# Patient Record
Sex: MALE | Race: BLACK OR AFRICAN AMERICAN | NOT HISPANIC OR LATINO | Employment: FULL TIME | URBAN - METROPOLITAN AREA
[De-identification: names, ages, dates, MRNs, and addresses within clinical notes are randomized per-mention and may not be internally consistent; named-entity substitution may affect disease eponyms.]

---

## 2019-09-18 LAB
ALBUMIN UR-MCNC: NEGATIVE MG/DL
APPEARANCE UR: CLEAR
BACTERIA #/AREA URNS HPF: ABNORMAL /HPF
BILIRUB UR QL STRIP: NEGATIVE
COLOR UR AUTO: ABNORMAL
GLUCOSE UR STRIP-MCNC: NEGATIVE MG/DL
HGB UR QL STRIP: ABNORMAL
KETONES UR STRIP-MCNC: NEGATIVE MG/DL
LEUKOCYTE ESTERASE UR QL STRIP: NEGATIVE
NITRATE UR QL: NEGATIVE
PH UR STRIP: 5.5 PH (ref 5–7)
RBC #/AREA URNS AUTO: 15 /HPF (ref 0–2)
SOURCE: ABNORMAL
SP GR UR STRIP: 1 (ref 1–1.03)
UROBILINOGEN UR STRIP-MCNC: NORMAL MG/DL (ref 0–2)
WBC #/AREA URNS AUTO: 1 /HPF (ref 0–5)

## 2019-09-18 PROCEDURE — 96360 HYDRATION IV INFUSION INIT: CPT

## 2019-09-18 PROCEDURE — 99283 EMERGENCY DEPT VISIT LOW MDM: CPT

## 2019-09-18 PROCEDURE — 81001 URINALYSIS AUTO W/SCOPE: CPT | Performed by: EMERGENCY MEDICINE

## 2019-09-18 ASSESSMENT — MIFFLIN-ST. JEOR: SCORE: 1623.07

## 2019-09-19 ENCOUNTER — HOSPITAL ENCOUNTER (EMERGENCY)
Facility: CLINIC | Age: 29
Discharge: HOME | End: 2019-09-19
Attending: EMERGENCY MEDICINE | Admitting: EMERGENCY MEDICINE
Payer: OTHER GOVERNMENT

## 2019-09-19 VITALS
SYSTOLIC BLOOD PRESSURE: 136 MMHG | TEMPERATURE: 97.2 F | HEART RATE: 76 BPM | BODY MASS INDEX: 27.55 KG/M2 | DIASTOLIC BLOOD PRESSURE: 93 MMHG | OXYGEN SATURATION: 100 % | RESPIRATION RATE: 18 BRPM | HEIGHT: 69 IN | WEIGHT: 186 LBS

## 2019-09-19 DIAGNOSIS — R31.0 GROSS HEMATURIA: ICD-10-CM

## 2019-09-19 LAB
ANION GAP SERPL CALCULATED.3IONS-SCNC: 1 MMOL/L (ref 3–14)
BUN SERPL-MCNC: 10 MG/DL (ref 7–30)
CALCIUM SERPL-MCNC: 8.7 MG/DL (ref 8.5–10.1)
CHLORIDE SERPL-SCNC: 101 MMOL/L (ref 94–109)
CK SERPL-CCNC: 295 U/L (ref 30–300)
CO2 SERPL-SCNC: 31 MMOL/L (ref 20–32)
CREAT SERPL-MCNC: 1.11 MG/DL (ref 0.66–1.25)
GFR SERPL CREATININE-BSD FRML MDRD: 66 ML/MIN/{1.73_M2}
GLUCOSE SERPL-MCNC: 96 MG/DL (ref 70–99)
POTASSIUM SERPL-SCNC: 4 MMOL/L (ref 3.4–5.3)
SODIUM SERPL-SCNC: 133 MMOL/L (ref 133–144)

## 2019-09-19 PROCEDURE — 25000128 H RX IP 250 OP 636: Performed by: EMERGENCY MEDICINE

## 2019-09-19 PROCEDURE — 82550 ASSAY OF CK (CPK): CPT | Performed by: EMERGENCY MEDICINE

## 2019-09-19 PROCEDURE — 80048 BASIC METABOLIC PNL TOTAL CA: CPT | Performed by: EMERGENCY MEDICINE

## 2019-09-19 RX ORDER — SODIUM CHLORIDE 9 MG/ML
1000 INJECTION, SOLUTION INTRAVENOUS CONTINUOUS
Status: DISCONTINUED | OUTPATIENT
Start: 2019-09-19 | End: 2019-09-19 | Stop reason: HOSPADM

## 2019-09-19 RX ADMIN — SODIUM CHLORIDE 1000 ML: 9 INJECTION, SOLUTION INTRAVENOUS at 00:43

## 2019-09-19 ASSESSMENT — ENCOUNTER SYMPTOMS
ABDOMINAL PAIN: 0
HEMATURIA: 1
FLANK PAIN: 0

## 2019-09-19 NOTE — ED AVS SNAPSHOT
[facility] Emergency Department  [address]  [facility] [address]  Phone:  [phone]  Fax:  [phone]                                    Luis Alberto Alanis   MRN: [id number]    Department:  [facility] Emergency Department   Date of Visit:  9/18/2019           After Visit Summary Signature Page    I have received my discharge instructions, and my questions have been answered. I have discussed any challenges I see with this plan with the nurse or doctor.    ..........................................................................................................................................  Patient/Patient Representative Signature      ..........................................................................................................................................  Patient Representative Print Name and Relationship to Patient    ..................................................               ................................................  Date                                   Time    ..........................................................................................................................................  Reviewed by Signature/Title    ...................................................              ..............................................  Date                                               Time          22EPIC Rev 08/18

## 2019-09-19 NOTE — DISCHARGE INSTRUCTIONS
Your symptoms are suggestive of strain related hematuria, which is benign. Please follow up with urology for further evaluation as needed. Return with fever, flank pain, or inability to empty bladder.

## 2019-09-19 NOTE — ED PROVIDER NOTES
"  History     Chief Complaint:  Hematuria    The history is provided by the patient.      Luis Alberto Alanis is a 29 year old female who presents with concerns for hematuria two hours prior. He reports seeing bright red blood in the toilet after voiding tonight and details he ran twice tonight. He affirms history of similar symptoms (dark-red urine) secondary to RhabDo that usually resolves after fluid and states this also happened over the weekend but has since resolved. Luis Alberto denies any abdominal pain, flank pain, or current thinners.      Allergies:  No Known Drug Allergies    Medications:    Medications reviewed. No current medications.     Past Medical History:    Medical history reviewed. No pertinent medical history.    Past Surgical History:    Surgical history reviewed. No pertinent surgical history.    Family History:    Family history reviewed. No pertinent family history.     Social History:  Presents alone    Review of Systems   Gastrointestinal: Negative for abdominal pain.   Genitourinary: Positive for hematuria. Negative for flank pain.   All other systems reviewed and are negative.    Physical Exam   Patient Vitals for the past 24 hrs:   BP Temp Temp src Pulse Heart Rate Resp SpO2 Height Weight   09/19/19 0115 -- -- -- -- -- -- 100 % -- --   09/19/19 0100 -- -- -- -- -- -- 100 % -- --   09/19/19 0045 -- -- -- -- -- -- 100 % -- --   09/18/19 2259 (!) 136/93 97.2  F (36.2  C) Oral 76 76 18 96 % 1.753 m (5' 9\") 84.4 kg (186 lb)     Physical Exam   Constitutional: He is oriented to person, place, and time. He appears well-developed.   HENT:   Head: Normocephalic.   Cardiovascular: Normal rate.   Pulmonary/Chest: Effort normal.   Abdominal: Soft. He exhibits no distension. There is no tenderness.   Musculoskeletal: Normal range of motion.   Neurological: He is alert and oriented to person, place, and time.   Skin: Skin is warm. Capillary refill takes less than 2 seconds.       Emergency Department Course "     Laboratory:  UA: blood moderate (A) RBC 15 (H) bacteria few (A) o/w WNL    BMP: anion gap 1 (L) o/w WNL (creatinine 1.11)    CK total: 295    Emergency Department Course:  Nursing notes and vitals reviewed.  2305 The patient provided a urine sample here in the emergency department. This was sent for laboratory testing, findings above  0007 I performed an exam of the patient as documented above.   0042 Blood was drawn for laboratory testing, results above.  0115 I personally reviewed the labs with the patient and answered all related questions prior to discharge, anticipatory guidance given.    Impression & Plan      Medical Decision Making:  Luis Alberto Alanis is a 29 year old female who presents to the emergency department today for evaluation of painless gross hematuria.  Patient has a history of prior possible rhabdomyolysis.  However this time he is seeing red blood.  This is associated with exercise.  I suspect stress related.  The patient has no pain.  His examination is normal.  Patient seem to be profoundly concerned about rhabdo and therefore CK and BUN and creatinine were ordered and were negative.  She was offered reassurance.  Patient will need to follow-up with urology as I suspect at some point he may need a cystis scope to evaluate for gross hematuria.  Though in this 29-year-old otherwise healthy male if he continues to have blood with exercise by definition this is exertional hematuria and requires no further work-up at this time.  Patient is not on anticoagulation recommended to avoid aspirin and follow-up with urology.    Diagnosis:    ICD-10-CM   1. Gross hematuria R31.0     Disposition: Home    Scribe Disclosure:  Jose M THOMSON, am serving as a scribe at 12:11 AM on 9/19/2019 to document services personally performed by Mannie Edwards MD based on my observations and the provider's statements to me.    [facility] EMERGENCY DEPARTMENT       Mannie Edwards MD  09/20/19  1039

## 2019-09-19 NOTE — ED TRIAGE NOTES
Noticed blood in urine last night. Today urine was cleared but about 2 hours ago, noticed bright blood in urine again. ABCs intact. History of rhabdomyolysis with blood in urine. Denies any pain. ABCs intact.

## 2019-10-15 DIAGNOSIS — R31.9 HEMATURIA: Primary | ICD-10-CM

## 2020-04-04 ENCOUNTER — NURSE TRIAGE (OUTPATIENT)
Dept: NURSING | Facility: CLINIC | Age: 30
End: 2020-04-04

## 2020-04-05 NOTE — TELEPHONE ENCOUNTER
Symptoms started on thursday, lots of drainage in nose and down throat. Main complaint is nasal congestion/swelling. FNA advised continuing the oral antihistamines and adding decongestant nose drops.FNA discussed common COVID symptoms vs allergy symptoms. Advised to speak to his PCP next week if not improved.  Patient voiced understand and will follow disposition.   Meenu Liz RN  [facility] Nurse Advisor          Reason for Disposition    [1] Taking antihistamines > 2 days AND [2] nasal allergy symptoms interfere with sleep, school, or work    Additional Information    Negative: [1] Wheezing (high pitched whistling sound) AND [2] previous asthma attacks or use of asthma medicines    Negative: [1] Wheezing (high pitched whistling sound) AND [2] no history of asthma    Negative: Eye redness and itching are the only symptoms    Negative: Doesn't match the SYMPTOMS for Hay Fever    Negative: Patient sounds very sick or weak to the triager    Negative: Lots of coughing    Negative: [1] Lots of yellow or green discharge from nose AND [2] present > 3 days    Protocols used: NASAL ALLERGIES (HAY FEVER)-A-

## 2021-02-19 ENCOUNTER — TRANSFERRED RECORDS (OUTPATIENT)
Dept: HEALTH INFORMATION MANAGEMENT | Facility: CLINIC | Age: 31
End: 2021-02-19
Payer: OTHER GOVERNMENT

## 2021-02-19 LAB
ALT SERPL-CCNC: 14 U/L (ref 4–50)
AST SERPL-CCNC: 27 U/L (ref 12–35)
CHOLESTEROL (EXTERNAL): 219 MG/DL (ref 90–200)
CREATININE (EXTERNAL): 1.3 MG/DL (ref 0.6–1.3)
GLUCOSE (EXTERNAL): 90 MG/DL (ref 60–115)
HDLC SERPL-MCNC: 44 MG/DL
LDL CHOLESTEROL (EXTERNAL): 159 MG/DL
POTASSIUM (EXTERNAL): 3.9 MMOL/L (ref 3.5–4.9)
TRIGLYCERIDES (EXTERNAL): 82 MG/DL (ref 40–197)
TSH SERPL-ACNC: 1.5 UIU/ML (ref 0.27–4.2)

## 2021-12-06 ENCOUNTER — TELEPHONE (OUTPATIENT)
Dept: FAMILY MEDICINE | Facility: CLINIC | Age: 31
End: 2021-12-06
Payer: OTHER GOVERNMENT

## 2021-12-06 NOTE — TELEPHONE ENCOUNTER
Reason for Call:  Other appointment    Detailed comments: Mother called and is requesting to see if pt can worked in for stomach cramping for 3 weeks no abdominal pain no other symptoms please advise thank you    Phone Number Patient can be reached at: Other phone number:  [phone]    Best Time: anytime    Can we leave a detailed message on this number? YES    Call taken on 12/6/2021 at 11:59 AM by Alexandra Malone

## 2021-12-06 NOTE — TELEPHONE ENCOUNTER
Spoke with the mother Lois who states they are scheduled in [place] for tomorrow.  Micaela Field,

## 2021-12-07 ENCOUNTER — TRANSFERRED RECORDS (OUTPATIENT)
Dept: HEALTH INFORMATION MANAGEMENT | Facility: CLINIC | Age: 31
End: 2021-12-07

## 2021-12-07 ENCOUNTER — OFFICE VISIT (OUTPATIENT)
Dept: FAMILY MEDICINE | Facility: CLINIC | Age: 31
End: 2021-12-07
Payer: OTHER GOVERNMENT

## 2021-12-07 VITALS
OXYGEN SATURATION: 99 % | WEIGHT: 180.4 LBS | BODY MASS INDEX: 26.72 KG/M2 | HEART RATE: 73 BPM | HEIGHT: 69 IN | SYSTOLIC BLOOD PRESSURE: 122 MMHG | DIASTOLIC BLOOD PRESSURE: 76 MMHG | RESPIRATION RATE: 18 BRPM | TEMPERATURE: 97.8 F

## 2021-12-07 DIAGNOSIS — R10.9 ABDOMINAL CRAMPING: ICD-10-CM

## 2021-12-07 DIAGNOSIS — Z13.220 SCREENING FOR HYPERLIPIDEMIA: ICD-10-CM

## 2021-12-07 DIAGNOSIS — Z12.5 SCREENING FOR PROSTATE CANCER: Primary | ICD-10-CM

## 2021-12-07 DIAGNOSIS — R14.0 ABDOMINAL BLOATING: ICD-10-CM

## 2021-12-07 PROBLEM — R31.9 HEMATURIA: Status: ACTIVE | Noted: 2021-12-07

## 2021-12-07 PROBLEM — E80.4 GILBERT'S SYNDROME: Status: ACTIVE | Noted: 2021-12-07

## 2021-12-07 PROBLEM — N28.9 ACUTE RENAL INSUFFICIENCY: Status: RESOLVED | Noted: 2021-12-07 | Resolved: 2021-12-07

## 2021-12-07 PROBLEM — D72.819 LEUKOPENIA: Status: RESOLVED | Noted: 2021-12-07 | Resolved: 2021-12-07

## 2021-12-07 PROBLEM — N28.9 ACUTE RENAL INSUFFICIENCY: Status: ACTIVE | Noted: 2021-12-07

## 2021-12-07 PROBLEM — R31.9 HEMATURIA: Status: RESOLVED | Noted: 2021-12-07 | Resolved: 2021-12-07

## 2021-12-07 PROBLEM — M62.82 RHABDOMYOLYSIS: Status: RESOLVED | Noted: 2021-12-07 | Resolved: 2021-12-07

## 2021-12-07 PROBLEM — D72.819 LEUKOPENIA: Status: ACTIVE | Noted: 2021-12-07

## 2021-12-07 PROBLEM — M62.82 RHABDOMYOLYSIS: Status: ACTIVE | Noted: 2021-12-07

## 2021-12-07 PROCEDURE — 90471 IMMUNIZATION ADMIN: CPT | Performed by: PHYSICIAN ASSISTANT

## 2021-12-07 PROCEDURE — 36415 COLL VENOUS BLD VENIPUNCTURE: CPT | Performed by: PHYSICIAN ASSISTANT

## 2021-12-07 PROCEDURE — 90715 TDAP VACCINE 7 YRS/> IM: CPT | Performed by: PHYSICIAN ASSISTANT

## 2021-12-07 PROCEDURE — G0103 PSA SCREENING: HCPCS | Performed by: PHYSICIAN ASSISTANT

## 2021-12-07 PROCEDURE — 99204 OFFICE O/P NEW MOD 45 MIN: CPT | Mod: 25 | Performed by: PHYSICIAN ASSISTANT

## 2021-12-07 PROCEDURE — 80061 LIPID PANEL: CPT | Performed by: PHYSICIAN ASSISTANT

## 2021-12-07 RX ORDER — ESCITALOPRAM OXALATE 10 MG/1
10 TABLET ORAL PRN
COMMUNITY
Start: 2021-01-18 | End: 2022-04-12

## 2021-12-07 RX ORDER — OMEPRAZOLE 10 MG/1
20 CAPSULE, DELAYED RELEASE ORAL DAILY
COMMUNITY
End: 2022-04-12

## 2021-12-07 RX ORDER — ESZOPICLONE 3 MG/1
3 TABLET, FILM COATED ORAL AT BEDTIME
COMMUNITY
Start: 2021-07-20 | End: 2022-04-12

## 2021-12-07 RX ORDER — METRONIDAZOLE 250 MG/1
250 TABLET ORAL 3 TIMES DAILY
COMMUNITY
End: 2022-04-12

## 2021-12-07 RX ORDER — ERGOCALCIFEROL 1.25 MG/1
50000 CAPSULE, LIQUID FILLED ORAL WEEKLY
COMMUNITY
Start: 2021-10-25 | End: 2023-10-10

## 2021-12-07 ASSESSMENT — MIFFLIN-ST. JEOR: SCORE: 1753.67

## 2021-12-07 ASSESSMENT — PAIN SCALES - GENERAL: PAINLEVEL: NO PAIN (0)

## 2021-12-07 NOTE — PROGRESS NOTES
Assessment & Plan     Screening for prostate cancer  Patient requested screening, states this was screened by PCP in the past. Discussed, will proceed with screening per patient request.  - PSA, screen; Future  - PSA, screen    Screening for hyperlipidemia  - Lipid panel reflex to direct LDL Fasting; Future  - Lipid panel reflex to direct LDL Fasting    Abdominal bloating  Abdominal cramping  Following with [facility] as well. He actually just saw them this morning. Symptoms suspicious of IBS and labs are pending. SHARLA completed for [facility] to send results to me. Continue to follow with [facility].      Return in about 1 year (around 12/7/2022) for Preventive Physical Exam.    Kallie Young PA-C  [facility] [place]    45 minutes spent on the date of the encounter doing chart review, history and exam, documentation and further activities per the note     Subjective   Luis Alberto is a 31 year old who presents for the following health issues     History of Present Illness       He eats 2-3 servings of fruits and vegetables daily.He consumes 0 sweetened beverage(s) daily.He exercises with enough effort to increase his heart rate 30 to 60 minutes per day.  He exercises with enough effort to increase his heart rate 6 days per week.   He is taking medications regularly.       Concern - Abdominal Pain   Onset: Since November 1st    Description: Cramping, bloating, constipated    Intensity: severe  Progression of Symptoms:  worsening  Accompanying Signs & Symptoms: Gas, cramping   Previous history of similar problem: College, moving to [place], Moving to [place], 2016 - was diagnosed with IBS   Precipitating factors:        Worsened by: Stress related, he was chewing 1 pack of Extra sugar free gum a day - thinks that might be a factor.   Alleviating factors:        Improved by: Healthy diet   Therapies tried and outcome:  none     Upset stomach since November 1 - had COVID booster shot that day  Had diarrhea one day at the  "beginning of symptoms and low appetite, nausea but no vomiting. Took imodium for 3 days.  Those symptoms resolved but then started noticing stomach cramps, heartburn for about 1 week.  Took prilosec   Flagyl, bentyl from years ago when he was treated for IBS  Symptoms started to improve and he stopped the medications  Now noticing stomach cramping after bowel movements in the mornings  Will notice some \"stomach bubbling\" after eating  Increased gas  Normal bowel movements now - no constipation or diarrhea  Still noticing heartburn    On a diet for past few months, trying to lose weight  Mostly eating cucumbers, tomatoes, salads with chicken, pistachios, tuna    Has struggled with constipation in the past during college, had colonoscopy that he reports was normal. After graduating college, constipation resolved.    He did see [facility] this morning and was told symptoms were likely related to IBS. They ordered some labs and recommended dicyclomine and simethicone. They also ordered EGD per patient request.    He is establishing care here today. Would like cholesterol and PSA screened. He is highly invested in preventing chronic disease and wants to have everything checked.    Review of Systems   Constitutional, HEENT, cardiovascular, pulmonary, gi and gu systems are negative, except as otherwise noted.      Objective    /76 (BP Location: Right arm, Patient Position: Sitting, Cuff Size: Adult Regular)   Pulse 73   Temp 97.8  F (36.6  C) (Oral)   Resp 18   Ht 1.753 m (5' 9\")   Wt 81.8 kg (180 lb 6.4 oz)   SpO2 99%   BMI 26.64 kg/m    Body mass index is 26.64 kg/m .  Physical Exam   GENERAL: healthy, alert and no distress  NECK: no adenopathy, no asymmetry, masses, or scars and thyroid normal to palpation  RESP: lungs clear to auscultation - no rales, rhonchi or wheezes  CV: regular rate and rhythm  ABDOMEN: soft, nontender, no hepatosplenomegaly, no masses and bowel sounds normal  NEURO: Normal strength and " tone, mentation intact and speech normal  PSYCH: mentation appears normal, affect normal/bright

## 2021-12-08 LAB — PSA SERPL-MCNC: 0.61 UG/L (ref 0–4)

## 2021-12-14 ENCOUNTER — TELEPHONE (OUTPATIENT)
Dept: FAMILY MEDICINE | Facility: CLINIC | Age: 31
End: 2021-12-14
Payer: OTHER GOVERNMENT

## 2021-12-16 LAB
CHOLEST SERPL-MCNC: 213 MG/DL
FASTING STATUS PATIENT QL REPORTED: YES
HDLC SERPL-MCNC: 63 MG/DL
LDLC SERPL CALC-MCNC: 140 MG/DL
NONHDLC SERPL-MCNC: 150 MG/DL
TRIGL SERPL-MCNC: 48 MG/DL

## 2022-01-30 ENCOUNTER — HEALTH MAINTENANCE LETTER (OUTPATIENT)
Age: 32
End: 2022-01-30

## 2022-02-21 ENCOUNTER — TELEPHONE (OUTPATIENT)
Dept: FAMILY MEDICINE | Facility: CLINIC | Age: 32
End: 2022-02-21
Payer: OTHER GOVERNMENT

## 2022-02-21 NOTE — TELEPHONE ENCOUNTER
Reason for call:  Form   Our goal is to have forms completed within 72 hours, however some forms may require a visit or additional information.     Who is the form from? Patient  Where did the form come from? Patient or family brought in     What clinic location was the form placed at? [place]  Where was the form placed? Kallie Young Box/Folder  What number is listed as a contact on the form? [phone]    Phone call message - patient request for a letter, form or note:     Date needed: as soon as possible  Patient will  at the clinic when completed  Has the patient signed a consent form for release of information? NO    Additional comments:     Type of letter, form or note: medical    Phone number to reach patient:  Cell number on file:    Telephone Information:   Mobile [phone]       Best Time:  any    Can we leave a detailed message on this number?  YES    Travel screening: Not Applicable

## 2022-03-07 DIAGNOSIS — E55.9 VITAMIN D DEFICIENCY: Primary | ICD-10-CM

## 2022-03-07 RX ORDER — ERGOCALCIFEROL 1.25 MG/1
50000 CAPSULE, LIQUID FILLED ORAL WEEKLY
Status: CANCELLED | OUTPATIENT
Start: 2022-03-07

## 2022-03-07 NOTE — TELEPHONE ENCOUNTER
Called the pt to advise of below.      Offered to schedule appts for lab or for his ankle.  He will call back.  He says his schedule is very busy and he would just prefer prescriptions.  Advised again of below and explained rationale.  Had offered lab appt for today, advised they do book up, so call back when he is able.

## 2022-03-07 NOTE — TELEPHONE ENCOUNTER
I have not prescribed high dose vitamin D and we have not checked his labs. Recommend vitamin D lab and we can discuss if high dose vitamin D is needed after results.    He can use ibuprofen 600 mg 3 times daily for ankle pain if needed. Recommend visit if ongoing pain.    Kallie Young PA-C

## 2022-03-13 ENCOUNTER — APPOINTMENT (OUTPATIENT)
Dept: GENERAL RADIOLOGY | Facility: CLINIC | Age: 32
End: 2022-03-13
Attending: EMERGENCY MEDICINE
Payer: OTHER GOVERNMENT

## 2022-03-13 ENCOUNTER — HOSPITAL ENCOUNTER (EMERGENCY)
Facility: CLINIC | Age: 32
Discharge: HOME | End: 2022-03-14
Attending: EMERGENCY MEDICINE | Admitting: EMERGENCY MEDICINE
Payer: OTHER GOVERNMENT

## 2022-03-13 DIAGNOSIS — S90.32XA CONTUSION OF LEFT FOOT, INITIAL ENCOUNTER: ICD-10-CM

## 2022-03-13 DIAGNOSIS — R19.7 DIARRHEA, UNSPECIFIED TYPE: ICD-10-CM

## 2022-03-13 LAB
ALBUMIN SERPL-MCNC: 4 G/DL (ref 3.4–5)
ALP SERPL-CCNC: 59 U/L (ref 40–150)
ALT SERPL W P-5'-P-CCNC: 55 U/L (ref 0–70)
ANION GAP SERPL CALCULATED.3IONS-SCNC: 4 MMOL/L (ref 3–14)
AST SERPL W P-5'-P-CCNC: 41 U/L (ref 0–45)
BASOPHILS # BLD AUTO: 0 10E3/UL (ref 0–0.2)
BASOPHILS NFR BLD AUTO: 0 %
BILIRUB SERPL-MCNC: 2 MG/DL (ref 0.2–1.3)
BUN SERPL-MCNC: 13 MG/DL (ref 7–30)
CALCIUM SERPL-MCNC: 8.9 MG/DL (ref 8.5–10.1)
CHLORIDE BLD-SCNC: 110 MMOL/L (ref 94–109)
CO2 SERPL-SCNC: 25 MMOL/L (ref 20–32)
CREAT SERPL-MCNC: 1.27 MG/DL (ref 0.66–1.25)
EOSINOPHIL # BLD AUTO: 0.1 10E3/UL (ref 0–0.7)
EOSINOPHIL NFR BLD AUTO: 2 %
ERYTHROCYTE [DISTWIDTH] IN BLOOD BY AUTOMATED COUNT: 11.5 % (ref 10–15)
GFR SERPL CREATININE-BSD FRML MDRD: 76 ML/MIN/1.73M2
GLUCOSE BLD-MCNC: 88 MG/DL (ref 70–99)
HCT VFR BLD AUTO: 48 % (ref 40–53)
HGB BLD-MCNC: 15.6 G/DL (ref 13.3–17.7)
IMM GRANULOCYTES # BLD: 0 10E3/UL
IMM GRANULOCYTES NFR BLD: 0 %
LYMPHOCYTES # BLD AUTO: 1.3 10E3/UL (ref 0.8–5.3)
LYMPHOCYTES NFR BLD AUTO: 26 %
MCH RBC QN AUTO: 32.6 PG (ref 26.5–33)
MCHC RBC AUTO-ENTMCNC: 32.5 G/DL (ref 31.5–36.5)
MCV RBC AUTO: 100 FL (ref 78–100)
MONOCYTES # BLD AUTO: 0.9 10E3/UL (ref 0–1.3)
MONOCYTES NFR BLD AUTO: 18 %
NEUTROPHILS # BLD AUTO: 2.6 10E3/UL (ref 1.6–8.3)
NEUTROPHILS NFR BLD AUTO: 54 %
NRBC # BLD AUTO: 0 10E3/UL
NRBC BLD AUTO-RTO: 0 /100
PLATELET # BLD AUTO: 236 10E3/UL (ref 150–450)
POTASSIUM BLD-SCNC: 3.8 MMOL/L (ref 3.4–5.3)
PROT SERPL-MCNC: 7.8 G/DL (ref 6.8–8.8)
RBC # BLD AUTO: 4.78 10E6/UL (ref 4.4–5.9)
SODIUM SERPL-SCNC: 139 MMOL/L (ref 133–144)
WBC # BLD AUTO: 4.9 10E3/UL (ref 4–11)

## 2022-03-13 PROCEDURE — 99284 EMERGENCY DEPT VISIT MOD MDM: CPT | Mod: 25

## 2022-03-13 PROCEDURE — 85025 COMPLETE CBC W/AUTO DIFF WBC: CPT | Performed by: EMERGENCY MEDICINE

## 2022-03-13 PROCEDURE — 73630 X-RAY EXAM OF FOOT: CPT | Mod: LT

## 2022-03-13 PROCEDURE — 87493 C DIFF AMPLIFIED PROBE: CPT | Performed by: EMERGENCY MEDICINE

## 2022-03-13 PROCEDURE — 96361 HYDRATE IV INFUSION ADD-ON: CPT

## 2022-03-13 PROCEDURE — 87506 IADNA-DNA/RNA PROBE TQ 6-11: CPT | Performed by: EMERGENCY MEDICINE

## 2022-03-13 PROCEDURE — 258N000003 HC RX IP 258 OP 636: Performed by: EMERGENCY MEDICINE

## 2022-03-13 PROCEDURE — 96360 HYDRATION IV INFUSION INIT: CPT

## 2022-03-13 PROCEDURE — 36415 COLL VENOUS BLD VENIPUNCTURE: CPT | Performed by: EMERGENCY MEDICINE

## 2022-03-13 PROCEDURE — 80053 COMPREHEN METABOLIC PANEL: CPT | Performed by: EMERGENCY MEDICINE

## 2022-03-13 RX ORDER — DICYCLOMINE HCL 20 MG
20 TABLET ORAL 4 TIMES DAILY PRN
Qty: 20 TABLET | Refills: 0 | Status: SHIPPED | OUTPATIENT
Start: 2022-03-13 | End: 2022-03-23

## 2022-03-13 RX ORDER — SODIUM CHLORIDE 9 MG/ML
INJECTION, SOLUTION INTRAVENOUS CONTINUOUS
Status: DISCONTINUED | OUTPATIENT
Start: 2022-03-13 | End: 2022-03-14 | Stop reason: HOSPADM

## 2022-03-13 RX ADMIN — SODIUM CHLORIDE 1000 ML: 9 INJECTION, SOLUTION INTRAVENOUS at 22:38

## 2022-03-13 NOTE — LETTER
March 13, 2022      To Whom It May Concern:      Luis Alberto Alanis was seen in our Emergency Department today, 03/13/22.  I expect his condition to improve over the next 1 days.  He may return to work/school when improved.    Sincerely,        Debbie Silva RN

## 2022-03-14 ENCOUNTER — TELEPHONE (OUTPATIENT)
Dept: EMERGENCY MEDICINE | Facility: CLINIC | Age: 32
End: 2022-03-14
Payer: OTHER GOVERNMENT

## 2022-03-14 VITALS
HEART RATE: 76 BPM | TEMPERATURE: 97.8 F | SYSTOLIC BLOOD PRESSURE: 125 MMHG | OXYGEN SATURATION: 100 % | RESPIRATION RATE: 20 BRPM | DIASTOLIC BLOOD PRESSURE: 79 MMHG

## 2022-03-14 LAB
C COLI+JEJUNI+LARI FUSA STL QL NAA+PROBE: NOT DETECTED
C DIFF TOX B STL QL: NEGATIVE
EC STX1 GENE STL QL NAA+PROBE: NOT DETECTED
EC STX2 GENE STL QL NAA+PROBE: NOT DETECTED
NOROV GI+II ORF1-ORF2 JNC STL QL NAA+PR: NOT DETECTED
RVA NSP5 STL QL NAA+PROBE: DETECTED
SALMONELLA SP RPOD STL QL NAA+PROBE: NOT DETECTED
SHIGELLA SP+EIEC IPAH STL QL NAA+PROBE: NOT DETECTED
V CHOL+PARA RFBL+TRKH+TNAA STL QL NAA+PR: NOT DETECTED
Y ENTERO RECN STL QL NAA+PROBE: NOT DETECTED

## 2022-03-14 NOTE — PATIENT INSTRUCTIONS
"Patient education: Rotavirus infection (The Basics)  View in   language     Written by the doctors and editors at [facility]  Please read the Disclaimer at the end of this page.    What is rotavirus?  Rotavirus is a virus (germ) that can infect the intestines and cause diarrhea and vomiting. When a virus infects the intestines and causes diarrhea and vomiting, doctors call it \"viral gastroenteritis.\" In children, rotavirus is a common cause of viral gastroenteritis.    Children can get a rotavirus infection if they:    ?Touch an infected person or a surface with the virus on it, and then don't wash their hands.    ?Eat foods or drink liquids with the virus in them. If people with a rotavirus infection don't wash their hands, they can spread it to food or liquid they touch.    Adults can also get a rotavirus infection, but it is much more common in children.    What are the symptoms of a rotavirus infection?  A rotavirus infection commonly causes:    ?Vomiting    ?Diarrhea that is watery but not bloody    ?Fever    If your child has vomiting or diarrhea, their body can lose too much water. Doctors call this \"dehydration.\" Symptoms of dehydration can include:    ?Fewer wet diapers, or dark yellow or brown urine    ?No tears when a child cries    ?A dry mouth or cracked lips    ?Eyes that look sunken in the face    ?A \"sunken fontanel\" (in babies) - A fontanel is a gap between the bones in a baby's skull. When babies are dehydrated, the fontanel on the top of their head can look or feel caved in.    When should I call my child's doctor or nurse?  Call your child's doctor or nurse if your child:    ?Has any symptoms of dehydration    ?Has diarrhea that lasts more than a few days    ?Has vomiting that lasts more than 1 day    ?Vomits up blood, has bloody diarrhea, or has severe belly pain    ?Hasn't been willing to drink anything for a few hours, or can't keep fluids down    ?Hasn't needed to urinate in the past 6 to 8 " "hours (in older children), or hasn't had a wet diaper for 4 to 6 hours (in babies and young children)    ?Is urinating much more than usual    Will my child need tests?  Maybe. The doctor or nurse should be able to tell if your child has a rotavirus infection by learning about their symptoms and doing an exam.    They might also do:    ?Lab tests on a sample of your child's bowel movement    ?Blood or urine tests to check for dehydration    How is a rotavirus infection treated?  Most children do not need any treatment, because their symptoms will get better on their own. But it's important to make sure your child drinks enough fluids so that they don't get dehydrated. You'll know that you are giving your child enough fluids when their urine looks pale yellow or clear, or when your baby has a normal amount of wet diapers.    To prevent dehydration, you can:    ?Give your baby or young child an \"oral rehydration solution\" (such as Pedialyte). You can buy this in a grocery store or pharmacy. If your child is vomiting, you can try to give them a few teaspoons of fluid every few minutes. In general, oral rehydration solutions work better than juice, because juice sometimes makes diarrhea worse. But you can also try giving your child apple juice mixed with an equal amount of water.    ?Continue to breastfeed your baby, if they breastfeed.    You should NOT give your child medicines to stop diarrhea (anti-diarrhea medicines). These medicines can make the infection last longer.    If your child has a severe infection and gets dehydrated, they might need to be treated in the hospital. In the hospital, the doctor might give your child fluids through a thin tube that goes into a vein, called an \"IV.\"    Can a rotavirus infection be prevented?  Yes. Doctors recommend that all babies get a vaccine to prevent the rotavirus infection. Vaccines can prevent certain serious or deadly infections. There are 2 main types of rotavirus " vaccines. Depending on the type your baby gets, they will need either 2 or 3 doses of the vaccine.    If your child has a rotavirus infection, you can prevent spreading the infection by:    ?Washing your hands with soap after you change your child's diaper    ?Not changing your child's diaper near where you prepare food    ?Putting diapers in a sealed bag before you throw them out    ?Cleaning the diaper changing area with alcohol or with a bleach and water mixture

## 2022-03-14 NOTE — RESULT ENCOUNTER NOTE
Final Clostridium Difficile toxin B PCR is NEGATIVE.    No treatment or change in treatment per [facility] ED Lab Result Clostridium difficile protocol.

## 2022-03-14 NOTE — ED PROVIDER NOTES
History     Chief Complaint:  Medication Reaction       HPI   Luis Alberto Alanis is a 32 year old male who presents with concern for diarrhea.  Symptoms started yesterday.  He reports he is having watery stool every 10 minutes.  He recently took 2 pills of Augmentin for sinus infection and thinks this could be contributing.  He denies recent travel or other sick contacts.  No black or blood in the stool.  No abdominal pain.  No vomiting.  No fever.  He reports history of IBS.  He tried Imodium and Pepto-Bismol without relief.  He is also concerned about pain in the left foot and right lateral thigh region after falling on ice recently.  He has been able to walk on the foot with some discomfort.    ROS:  Review of Systems  A 10 point ROS was obtained and negative except as noted here and in HPI      Allergies:  No Known Allergies     Medications:    dicyclomine (BENTYL) 20 MG tablet  methylcellulose (CITRUCEL) powder  escitalopram (LEXAPRO) 10 MG tablet  eszopiclone (LUNESTA) 3 MG tablet  metroNIDAZOLE (FLAGYL) 250 MG tablet  omeprazole (PRILOSEC) 10 MG DR capsule  vitamin D2 (ERGOCALCIFEROL) 98802 units (1250 mcg) capsule        Past Medical History:    Past Medical History:   Diagnosis Date     Acute renal insufficiency 12/7/2021     Hematuria 12/7/2021     Leukopenia 12/7/2021     Rhabdomyolysis 12/7/2021     Patient Active Problem List   Diagnosis     Gilbert's syndrome        Past Surgical History:    Past Surgical History:   Procedure Laterality Date     VARICOCELE EXCISION Bilateral         Family History:    family history includes Asthma in his maternal grandfather; Diverticulitis in his maternal grandmother and mother; Glaucoma in his maternal aunt, maternal grandmother, and maternal uncle.    Social History:   reports that he has been smoking cigars. He has quit using smokeless tobacco. He reports current alcohol use. He reports previous drug use.  PCP: Kallie Young     Physical Exam     Patient Vitals for  the past 24 hrs:   BP Temp Temp src Pulse Resp SpO2   03/14/22 0018 125/79 -- -- 76 -- 100 %   03/13/22 2158 128/79 97.8  F (36.6  C) Oral 95 20 99 %        Physical Exam  VS: Reviewed per above  HENT: normal speech  EYES: sclera anicteric  CV: Rate as noted, regular rhythm.   RESP: Effort normal. Breath sounds are normal bilaterally.  GI: no tenderness/rebound/guarding, not distended.  NEURO: Alert, moving all extremities  MSK: No deformity of the extremities, mild tenderness of the mid dorsal left foot.  Intact left DP pulse.  Tenderness of the right lateral thigh musculature/IT band region.  No pain with passive range of motion of the right hip or right knee.  SKIN: Warm and dry    Emergency Department Course       Imaging:  Foot XR, G/E 3 views, left   Final Result   IMPRESSION: Normal joint spaces and alignment. No fracture.         Report per radiology    Laboratory:  Labs Ordered and Resulted from Time of ED Arrival to Time of ED Departure   COMPREHENSIVE METABOLIC PANEL - Abnormal       Result Value    Sodium 139      Potassium 3.8      Chloride 110 (*)     Carbon Dioxide (CO2) 25      Anion Gap 4      Urea Nitrogen 13      Creatinine 1.27 (*)     Calcium 8.9      Glucose 88      Alkaline Phosphatase 59      AST 41      ALT 55      Protein Total 7.8      Albumin 4.0      Bilirubin Total 2.0 (*)     GFR Estimate 76     CBC WITH PLATELETS AND DIFFERENTIAL    WBC Count 4.9      RBC Count 4.78      Hemoglobin 15.6      Hematocrit 48.0            MCH 32.6      MCHC 32.5      RDW 11.5      Platelet Count 236      % Neutrophils 54      % Lymphocytes 26      % Monocytes 18      % Eosinophils 2      % Basophils 0      % Immature Granulocytes 0      NRBCs per 100 WBC 0      Absolute Neutrophils 2.6      Absolute Lymphocytes 1.3      Absolute Monocytes 0.9      Absolute Eosinophils 0.1      Absolute Basophils 0.0      Absolute Immature Granulocytes 0.0      Absolute NRBCs 0.0     CLOSTRIDIUM DIFFICILE TOXIN B  "  ENTERIC BACTERIA AND VIRUS PANEL BY ALDO STOOL            Emergency Department Course:             Reviewed:  I reviewed nursing notes, vitals and past medical history    Assessments:   I obtained history and examined the patient as noted above.    I rechecked the patient and explained findings.         Interventions:  Medications   0.9% sodium chloride BOLUS (0 mLs Intravenous Stopped 3/14/22 0018)     Followed by   sodium chloride 0.9% infusion (has no administration in time range)        Disposition:  The patient was discharged to home.     Impression & Plan          Medical Decision Making:  Patient presents to the ER for evaluation of profuse watery stool after starting Augmentin for possible sinus infection.  On arrival vital signs are reassuring.  Abdominal exam is benign.  No high fever or blood in the stool to suggest invasive bacterial diarrhea.  Basic labs are reassuring aside from elevated bilirubin, which patient states is due to underlying Gilbert syndrome, as well as mild creatinine elevation.  Patient was given IV fluids.  Patient was interested in something to \"stop the diarrhea\".  I discussed that aside from Imodium and Pepto-Bismol, there are not any other agents I would recommend.  We did talk about fiber supplement to help increase formed his stool as well as probiotics.  Patient was prescribed Bentyl, which he reports has helped him in the past with abdominal cramping associated diarrhea.  I also encourage stopping the antibiotics, as this could certainly be antibiotic associated diarrhea.  Enteric panel and C. difficile testing are pending at time of discharge.  Encouraged close primary care follow-up and return precautions discussed prior to discharge.    Diagnosis:    ICD-10-CM    1. Diarrhea, unspecified type  R19.7    2. Contusion of left foot, initial encounter  S90.32XA         Discharge Medications:  Discharge Medication List as of 3/14/2022 12:19 AM      START taking these medications "    Details   dicyclomine (BENTYL) 20 MG tablet Take 1 tablet (20 mg) by mouth 4 times daily as needed (abdominal cramping), Disp-20 tablet, R-0, Local Print      methylcellulose (CITRUCEL) powder Take 1 tablespoon daily mixed in at least 8 ounces of water., Disp-454 g, R-0, Local Print              3/13/2022   Michael Wade MD Lindenbaum, Elan, MD  03/14/22 0037

## 2022-03-14 NOTE — ED NOTES
"Patient presented to nurses desk, asking about when staff would place his IV. Patient had been in the toilet previously, had just returned when started asking about IV. Staff entered room and placed IV, collected sample. Patient then report multiple complaints in addition to loose stools.     States he fell on the ice a couple of days ago and that his left leg and foot are now store and tender to the touch. Patient reports being unable to walk on effected foot (watched without issue from triage, and back and forth from the toilet.). patient also reporting \"I am very disappointed that you have not given me something to stop this loose stools! What was the point of coming to the ER if you aren't going to fix the issue of me going to the bathroom every 10 minutes.... normally when I doctor in [place] the give me phenergan for this!\" Attempted to reassure patient that the care team wanted to support him, without much success.   "

## 2022-03-14 NOTE — RESULT ENCOUNTER NOTE
Final Enteric Bacteria and Virus Panel by ALDO Stool is POSITIVE for Rotavirus  Recommendations in treatment per [facility] ED Lab Result Enteric Bacteria and Virus Panel protocol.

## 2022-03-14 NOTE — TELEPHONE ENCOUNTER
"[facility] Emergency Department/Urgent Care Lab result notification:    [place] ED lab result protocol used  Rotavirus    Reason for call  Notify of lab results, assess symptoms,  review ED providers recommendations/discharge instructions (if necessary) and advise per ED lab result f/u protocol    Lab Result   Final Enteric Bacteria and Virus Panel by ALDO Stool is POSITIVE for Rotavirus  Recommendations in treatment per [facility] ED Lab Result Enteric Bacteria and Virus Panel protocol.    Information table from Emergency Dept Provider visit on 3/13/22  Symptoms reported at ED visit (Chief complaint, HPI) Chief Complaint:  Medication Reaction     HPI   Luis Alberto Alanis is a 32 year old male who presents with concern for diarrhea.  Symptoms started yesterday.  He reports he is having watery stool every 10 minutes.  He recently took 2 pills of Augmentin for sinus infection and thinks this could be contributing.  He denies recent travel or other sick contacts.  No black or blood in the stool.  No abdominal pain.  No vomiting.  No fever.  He reports history of IBS.  He tried Imodium and Pepto-Bismol without relief.  He is also concerned about pain in the left foot and right lateral thigh region after falling on ice recently.  He has been able to walk on the foot with some discomfort.      ED providers Impression and Plan (applicable information) Patient presents to the ER for evaluation of profuse watery stool after starting Augmentin for possible sinus infection.  On arrival vital signs are reassuring.  Abdominal exam is benign.  No high fever or blood in the stool to suggest invasive bacterial diarrhea.  Basic labs are reassuring aside from elevated bilirubin, which patient states is due to underlying Gilbert syndrome, as well as mild creatinine elevation.  Patient was given IV fluids.  Patient was interested in something to \"stop the diarrhea\".  I discussed that aside from Imodium and Pepto-Bismol, " there are not any other agents I would recommend.  We did talk about fiber supplement to help increase formed his stool as well as probiotics.  Patient was prescribed Bentyl, which he reports has helped him in the past with abdominal cramping associated diarrhea.  I also encourage stopping the antibiotics, as this could certainly be antibiotic associated diarrhea.  Enteric panel and C. difficile testing are pending at time of discharge.  Encouraged close primary care follow-up and return precautions discussed prior to discharge.   Miscellaneous information NA     RN Assessment (Patient s current Symptoms), include time called.  [Insert Left message here if message left]  At 3PM.  3 diarrhea stools today.  Sx's started on Saturday.  No nausea.  Diarrhea is getting less.  Slight cramping right now.    RN Recommendations/Instructions per [facility] ED lab result protocol  Patient notified of lab result and treatment recommendations.    Please Contact your PCP clinic or return to the Emergency department if your:    Symptoms worsen or other concerning symptom's.    PCP follow-up Questions asked: YES       Josh Wade RN  [facility] [employer] [facility]  Emergency Dept Lab Result RN  Ph# [phone]     Copy of Lab result   Component      Latest Ref Rng & Units 3/13/2022   Campylobacter group by ALDO      Not Detected Not Detected   Salmonella species by ALDO      Not Detected Not Detected   Shigella species by ALDO      Not Detected Not Detected   Vibrio group by ALDO      Not Detected Not Detected   Rotavirus A by ALDO      Not Detected Detected (A)   Shiga toxin 1 gene by ALDO      Not Detected Not Detected   Shiga toxin 2 gene by ALDO      Not Detected Not Detected   Norovirus I and II by ALDO      Not Detected Not Detected   Yersinia enterocolitica by ALDO      Not Detected Not Detected

## 2022-04-12 ENCOUNTER — VIRTUAL VISIT (OUTPATIENT)
Dept: FAMILY MEDICINE | Facility: CLINIC | Age: 32
End: 2022-04-12
Payer: OTHER GOVERNMENT

## 2022-04-12 DIAGNOSIS — J01.90 ACUTE SINUSITIS, RECURRENCE NOT SPECIFIED, UNSPECIFIED LOCATION: Primary | ICD-10-CM

## 2022-04-12 PROCEDURE — 99214 OFFICE O/P EST MOD 30 MIN: CPT | Mod: TEL | Performed by: PHYSICIAN ASSISTANT

## 2022-04-12 RX ORDER — METHYLPREDNISOLONE 4 MG
TABLET, DOSE PACK ORAL
Qty: 21 TABLET | Refills: 0 | Status: SHIPPED | OUTPATIENT
Start: 2022-04-12 | End: 2022-10-14

## 2022-04-12 NOTE — PROGRESS NOTES
Luis Alberto is a 32 year old who is being evaluated via a billable telephone visit.      What phone number would you like to be contacted at? [phone]  How would you like to obtain your AVS? MyChart    Assessment & Plan     Acute sinusitis, recurrence not specified, unspecified location  Worsening symptoms over past 10 days. He has self treated with a few pills of amoxicillin and augmentin leftover from previous prescriptions. We discussed the importance of finishing the entire course of antibiotics when they are prescribed as well as not taking antibiotics on his own. Discussed risks of antibiotic resistance, side effects of medication, taking antibiotics when they are not indicated. Long discussion regarding viral vs bacterial sinus infections and indications for antibiotics. For now, since symptoms have been worsening over the past 10 days, will treat with augmentin. Instructed to complete the entire course of antibiotics. He also requests steroid as this has helped in the past when sinus pressure and symptoms are bad. Continue symptomatic treatment. Monitor and follow-up if worsening or no improvement. Risks and benefits of treatment plan discussed. Patient and/or parent acknowledges and agrees with plan of care, all questions answered.    - amoxicillin-clavulanate (AUGMENTIN) 875-125 MG tablet; Take 1 tablet by mouth in the morning and 1 tablet in the evening. Do all this for 7 days.  - methylPREDNISolone (MEDROL DOSEPAK) 4 MG tablet therapy pack; Follow Package Directions    Return in about 3 months (around 7/12/2022) for Preventive Physical Exam.    Kallie Young PA-C  [facility]   Luis Alberto is a 32 year old who presents for the following health issues     History of Present Illness       Reason for visit:  Sinus infection  Symptom onset:  1-2 weeks ago  Symptoms include:  Stuffiness, running nose,  Symptom intensity:  Severe  Symptom progression:  Staying the same  Had  "these symptoms before:  Yes  Has tried/received treatment for these symptoms:  Yes  Previous treatment was successful:  Yes  Prior treatment description:  Prednisone,  What makes it worse:  No  What makes it better:  Mucinex    He eats 2-3 servings of fruits and vegetables daily.He consumes 0 sweetened beverage(s) daily.He exercises with enough effort to increase his heart rate 30 to 60 minutes per day.  He exercises with enough effort to increase his heart rate 5 days per week.   He is taking medications regularly.       Acute Illness  Acute illness concerns: sinus congestion/pressure  Onset/Duration: 10 days    About 1 month ago, he had a few days of sinus symptoms and thought he needed to \"get on top of his symptoms\" so took leftover Augmentin that he had at home. He took it for about 3 days. He developed diarrhea which he thought could be related to the antibiotic but he ended up being evaluated in the ER and had rotavirus. Sinus symptoms resolved until about 10 days ago.    Current symptoms started about 10 days ago. He first developed post nasal drainage which triggered a cough, then developed worsening sinus congestion and pressure. His right ear feels plugged. Sinus pressure in forehead and behind eyes. No fever, sore throat. Mucinex has helped with drainage and cough, but the sinus pressure is worsening. Zyrtec and sudafed have not helped. He took a few 2-3 days of leftover Amoxicillin - sometimes once per day and sometimes twice per day. He also took a few pills of leftover Augmentin.     He does report a history of frequent sinus infections. Has needed antibiotics and steroids in the past for sinus infections.    Review of Systems   Constitutional, HEENT, cardiovascular, pulmonary, gi and gu systems are negative, except as otherwise noted.      Objective           Vitals:  No vitals were obtained today due to virtual visit.    Physical Exam   healthy, alert and no distress  PSYCH: Alert and oriented times " 3; coherent speech, normal   rate and volume, able to articulate logical thoughts, able   to abstract reason, no tangential thoughts, no hallucinations   or delusions  His affect is normal  RESP: No cough, no audible wheezing, able to talk in full sentences  Remainder of exam unable to be completed due to telephone visits      Phone call duration: 22 minutes

## 2022-09-18 ENCOUNTER — HEALTH MAINTENANCE LETTER (OUTPATIENT)
Age: 32
End: 2022-09-18

## 2022-10-12 ENCOUNTER — HOSPITAL ENCOUNTER (EMERGENCY)
Facility: CLINIC | Age: 32
Discharge: LEFT BEFORE DISCHARGE | End: 2022-10-12
Payer: OTHER GOVERNMENT

## 2022-10-12 ENCOUNTER — NURSE TRIAGE (OUTPATIENT)
Dept: FAMILY MEDICINE | Facility: CLINIC | Age: 32
End: 2022-10-12

## 2022-10-12 NOTE — TELEPHONE ENCOUNTER
Huddled with PCP who advised that pt be seen at .    Provider Recommendation Follow Up:   Reached patient/caregiver. Informed of provider's recommendations. Patient verbalized understanding and agrees with the plan.     Carey CHANCE RN

## 2022-10-12 NOTE — TELEPHONE ENCOUNTER
"Nurse Triage SBAR    Is this a 2nd Level Triage? YES, LICENSED PRACTITIONER REVIEW IS REQUIRED    Situation:   Pt calls reporting new and recent onset of numbness in legs. Pt reports numbness in right knee cap starting 3 weeks ago and left lateral knee numbness extending down his calf starting this AM.    Background:   Pt reports that he recently started exercising on high incline on treadmill. Pt states that he also had left foot numbness about 3 years ago after exercising on high incline treadmill which resolved on it's own after he stopped exercising on treadmill.    Assessment:   Pt reports right knee cap area is numb. Left lateral knee is also numb and numbness extends down into his calf. Pt also states that when he walks it feels like he is walking \"on a guitar string\". Pt states it feels like he is \"walking on a nerve\". Pt denies injuries, redness, swelling or weakness. Pt denies back pain or bowel/bladder changes. Pt reports that his muscles are tight, specifically his buttocks and calves. Pt is concerned that his symptoms may be related to MS as he has family history of MS.    Protocol Recommended Disposition:   Go To Office Now    Recommendation:   Advised patient that he should be seen to evaluate symptoms. No openings with PCP in next several days. Will route to PCP to advise on disposition. Pt has stopped running on the treadmill.    Routed to provider: Kallie Young    Does the patient meet one of the following criteria for ADS visit consideration? 16+ years old, with an [facility] PCP     TIP  Providers, please consider if this condition is appropriate for management at one of our Acute and Diagnostic Services sites.     If patient is a good candidate, please use dotphrase <dot>triageresponse and select Refer to ADS to document.      Reason for Disposition    Neurologic deficit of gradual onset (e.g., days to weeks), ANY of the following: * Weakness of the face, arm, or leg on one side of the body* " "Numbness of the face, arm, or leg on one side of the body* Loss of speech or garbled speech    Additional Information    Negative: Difficult to awaken or acting confused (e.g., disoriented, slurred speech)    Negative: New neurologic deficit that is present NOW, sudden onset of ANY of the following: * Weakness of the face, arm, or leg on one side of the body* Numbness of the face, arm, or leg on one side of the body* Loss of speech or garbled speech    Negative: Sounds like a life-threatening emergency to the triager    Negative: Confusion, disorientation, or hallucinations is main symptom    Negative: Dizziness is main symptom    Negative: Followed a head injury within last 3 days    Negative: Headache (with neurologic deficit)    Negative: Unable to urinate (or only a few drops) and bladder feels very full    Negative: Loss of bladder or bowel control (urine or bowel incontinence; wetting self, leaking stool) of new-onset    Negative: Back pain with numbness (loss of sensation) in groin or rectal area    Negative: Neurologic deficit that was brief (now gone), ANY of the following: * Weakness of the face, arm, or leg on one side of the body * Numbness of the face, arm, or leg on one side of the body * Loss of speech or garbled speech    Negative: Patient sounds very sick or weak to the triager    Answer Assessment - Initial Assessment Questions  1. SYMPTOM: \"What is the main symptom you are concerned about?\" (e.g., weakness, numbness)      Numbness.  2. ONSET: \"When did this start?\" (minutes, hours, days; while sleeping)      Left leg starting this AM, right knee three weeks ago.  3. LAST NORMAL: \"When was the last time you (the patient) were normal (no symptoms)?\"      3 weeks ago.  4. PATTERN \"Does this come and go, or has it been constant since it started?\"  \"Is it present now?\"      Constant.  5. CARDIAC SYMPTOMS: \"Have you had any of the following symptoms: chest pain, difficulty breathing, palpitations?\"      " "No.  6. NEUROLOGIC SYMPTOMS: \"Have you had any of the following symptoms: headache, dizziness, vision loss, double vision, changes in speech, unsteady on your feet?\"      No.  7. OTHER SYMPTOMS: \"Do you have any other symptoms?\"      No.  8. PREGNANCY: \"Is there any chance you are pregnant?\" \"When was your last menstrual period?\"      N/A.    Protocols used: NEUROLOGIC DEFICIT-A-[place]    Carey CHANCE RN    "

## 2022-10-13 ENCOUNTER — HOSPITAL ENCOUNTER (EMERGENCY)
Facility: CLINIC | Age: 32
Discharge: HOME | End: 2022-10-13
Attending: EMERGENCY MEDICINE | Admitting: EMERGENCY MEDICINE
Payer: OTHER GOVERNMENT

## 2022-10-13 VITALS
OXYGEN SATURATION: 97 % | HEART RATE: 65 BPM | RESPIRATION RATE: 18 BRPM | SYSTOLIC BLOOD PRESSURE: 122 MMHG | TEMPERATURE: 98.7 F | DIASTOLIC BLOOD PRESSURE: 77 MMHG

## 2022-10-13 DIAGNOSIS — M62.89 MUSCLE TIGHTNESS: ICD-10-CM

## 2022-10-13 DIAGNOSIS — R20.2 PARESTHESIAS: ICD-10-CM

## 2022-10-13 PROCEDURE — 99282 EMERGENCY DEPT VISIT SF MDM: CPT

## 2022-10-13 ASSESSMENT — ACTIVITIES OF DAILY LIVING (ADL): ADLS_ACUITY_SCORE: 33

## 2022-10-13 ASSESSMENT — ENCOUNTER SYMPTOMS
JOINT SWELLING: 0
ARTHRALGIAS: 0
BACK PAIN: 0
CONSTITUTIONAL NEGATIVE: 1
NUMBNESS: 1
MYALGIAS: 0

## 2022-10-13 NOTE — ED PROVIDER NOTES
History     Chief Complaint:  Numbness    HPI   Luis Alberto Alanis is a 32 year old male who presents with concerns for decreased sensation to a couple patches on both his right knee and his left knee.  He reports that 3 weeks ago he started to feel sensation about a quarter sized of numbness in his right medial knee area.  He notes this morning he woke up with a similar sensation of the lateral left knee but slightly larger.  He denies associated weakness.  He denies any associated back pain.  He notes tightness in his bilateral upper buttocks.  He notes leading up to the onset of the feeling in his right knee he was working out on a treadmill on a slope and is wondering if this might have triggered this.  He also notes a concern for possible MS.  He denies headache or vision changes.  He denies joint swelling or joint pain.  He also reports an episode 5 years ago of decreased sensation over the left lateral foot which has since resolved.    ROS:  Review of Systems   Constitutional: Negative.    Musculoskeletal: Negative for arthralgias, back pain, gait problem, joint swelling and myalgias.        Positive for tightness in the bilateral upper buttocks   Neurological: Positive for numbness.   All other systems reviewed and are negative.      Allergies:  No Known Allergies     Medications:    methylPREDNISolone (MEDROL DOSEPAK) 4 MG tablet therapy pack  vitamin D2 (ERGOCALCIFEROL) 23135 units (1250 mcg) capsule        Past Medical History:    Past Medical History:   Diagnosis Date     Acute renal insufficiency 12/7/2021     Hematuria 12/7/2021     Leukopenia 12/7/2021     Rhabdomyolysis 12/7/2021       Past Surgical History:    Past Surgical History:   Procedure Laterality Date     VARICOCELE EXCISION Bilateral         Family History:    family history includes Asthma in his maternal grandfather; Diverticulitis in his maternal grandmother and mother; Glaucoma in his maternal aunt, maternal grandmother, and maternal  uncle.    Social History:   reports that he has been smoking cigars. He has quit using smokeless tobacco. He reports current alcohol use. He reports that he does not currently use drugs.  PCP: Kallie Young     Physical Exam     Patient Vitals for the past 24 hrs:   BP Temp Temp src Pulse Resp SpO2   10/13/22 1028 129/75 98.7  F (37.1  C) Oral 96 18 99 %        Physical Exam  General: Adult male, sitting upright  CV: DP and PT pulses intact bilateral feet and ankles.  Regular rate and rhythm  Resp: Normal respiratory effort.  MSK: No back tenderness to palpation.  No extremity edema. Nontender. Normal active range of motion.  Ambulatory with steady gait.  Skin: Warm and dry. No rashes or lesions or ecchymoses on visible skin.  Neuro: Alert and oriented. Responds appropriately to all questions and commands.  Decreased sensation but still senses touch over the right medial knee, and a small area, as well as the left lateral knee and proximal anterior lower leg.  Normal muscle tone.  Sensation intact to light touch over the remaining portions of the lower extremities, all dermatomes.    Psych: Normal mood and affect. Pleasant.    Emergency Department Course     Emergency Department Course:    Reviewed:  I reviewed nursing notes, vitals and past medical history    Assessments:   I obtained history and examined the patient as noted above.      Disposition:  The patient was discharged to home.     Impression & Plan    Medical Decision Making:  Luis Alberto Alanis is a 32-year-old male who presents emergency department with decreased sensation to his right medial knee and left lateral knee over the past 3 weeks.  He has some associated mild upper buttock tightness bilaterally but no other symptoms.  Prior to the onset of the symptoms he had been working out on the treadmill at a steep slope.  Perhaps these paresthesias are secondary to repetitive use injury/strain or inflammation soft tissue with subsequent peripheral nerve  involvement.  He has no focal neurovascular deficit on my examination.  His symptoms are mild and not associated weakness or pain or swelling or skin color change.  They are not consistent with acute CVA.  MS cannot be entirely excluded this presentation would be somewhat atypical, not requiring emergent MRI.  Other underlying conditions such as mineral vitamin deficiency cannot be excluded emergently.  His evaluation can be obtained safely with a primary care clinic and possibly outpatient neurology clinic.  In the meantime he is recommended to avoid strenuous exercise, rest, use warm heating pads over the area of buttock tightness.  He should return with worsening symptoms.  All questions were answered prior to discharge.    Diagnosis:    ICD-10-CM    1. Paresthesias  R20.2       2. Muscle tightness  M62.89          10/13/2022   Annalisa Ba MD Jonkman, Tracy Dianne, MD  10/13/22 1316

## 2022-10-13 NOTE — ED TRIAGE NOTES
"Patient states he has had a numb feeling in his right knee for the last 3 wks and today he woke and has the same feeling in his left knee. Patient reports his hips also feel \"tight\".       Triage Assessment     Row Name 10/13/22 1027       Triage Assessment (Adult)    Airway WDL WDL       Respiratory WDL    Respiratory WDL WDL       Skin Circulation/Temperature WDL    Skin Circulation/Temperature WDL WDL       Cardiac WDL    Cardiac WDL WDL       Peripheral/Neurovascular WDL    Peripheral Neurovascular WDL WDL       Cognitive/Neuro/Behavioral WDL    Cognitive/Neuro/Behavioral WDL WDL              "

## 2022-10-14 ENCOUNTER — OFFICE VISIT (OUTPATIENT)
Dept: FAMILY MEDICINE | Facility: CLINIC | Age: 32
End: 2022-10-14
Payer: OTHER GOVERNMENT

## 2022-10-14 VITALS
TEMPERATURE: 98.5 F | DIASTOLIC BLOOD PRESSURE: 64 MMHG | OXYGEN SATURATION: 98 % | SYSTOLIC BLOOD PRESSURE: 106 MMHG | WEIGHT: 170 LBS | HEIGHT: 69 IN | BODY MASS INDEX: 25.18 KG/M2 | HEART RATE: 84 BPM

## 2022-10-14 DIAGNOSIS — E78.2 MIXED HYPERLIPIDEMIA: ICD-10-CM

## 2022-10-14 DIAGNOSIS — G47.26 SHIFTING SLEEP-WORK SCHEDULE: ICD-10-CM

## 2022-10-14 DIAGNOSIS — R20.2 PARESTHESIA: Primary | ICD-10-CM

## 2022-10-14 DIAGNOSIS — R30.0 DYSURIA: ICD-10-CM

## 2022-10-14 LAB
ALBUMIN SERPL BCG-MCNC: 4.8 G/DL (ref 3.5–5.2)
ALBUMIN UR-MCNC: NEGATIVE MG/DL
ALP SERPL-CCNC: 60 U/L (ref 40–129)
ALT SERPL W P-5'-P-CCNC: 14 U/L (ref 10–50)
ANION GAP SERPL CALCULATED.3IONS-SCNC: 9 MMOL/L (ref 7–15)
APPEARANCE UR: CLEAR
AST SERPL W P-5'-P-CCNC: 26 U/L (ref 10–50)
BACTERIA #/AREA URNS HPF: ABNORMAL /HPF
BILIRUB SERPL-MCNC: 1.7 MG/DL
BILIRUB UR QL STRIP: ABNORMAL
BUN SERPL-MCNC: 14.6 MG/DL (ref 6–20)
CALCIUM SERPL-MCNC: 9.8 MG/DL (ref 8.6–10)
CHLORIDE SERPL-SCNC: 104 MMOL/L (ref 98–107)
CHOLEST SERPL-MCNC: 211 MG/DL
COLOR UR AUTO: ABNORMAL
CREAT SERPL-MCNC: 1.15 MG/DL (ref 0.67–1.17)
DEPRECATED HCO3 PLAS-SCNC: 29 MMOL/L (ref 22–29)
ERYTHROCYTE [DISTWIDTH] IN BLOOD BY AUTOMATED COUNT: 11.7 % (ref 10–15)
GFR SERPL CREATININE-BSD FRML MDRD: 86 ML/MIN/1.73M2
GLUCOSE SERPL-MCNC: 84 MG/DL (ref 70–99)
GLUCOSE UR STRIP-MCNC: NEGATIVE MG/DL
HCT VFR BLD AUTO: 47.8 % (ref 40–53)
HDLC SERPL-MCNC: 67 MG/DL
HGB BLD-MCNC: 15.2 G/DL (ref 13.3–17.7)
HGB UR QL STRIP: NEGATIVE
KETONES UR STRIP-MCNC: ABNORMAL MG/DL
LDLC SERPL CALC-MCNC: 135 MG/DL
LEUKOCYTE ESTERASE UR QL STRIP: NEGATIVE
MCH RBC QN AUTO: 31.7 PG (ref 26.5–33)
MCHC RBC AUTO-ENTMCNC: 31.8 G/DL (ref 31.5–36.5)
MCV RBC AUTO: 100 FL (ref 78–100)
MUCOUS THREADS #/AREA URNS LPF: PRESENT /LPF
NITRATE UR QL: NEGATIVE
NONHDLC SERPL-MCNC: 144 MG/DL
PH UR STRIP: 5 [PH] (ref 5–7)
PLATELET # BLD AUTO: 265 10E3/UL (ref 150–450)
POTASSIUM SERPL-SCNC: 4.5 MMOL/L (ref 3.4–5.3)
PROT SERPL-MCNC: 7.2 G/DL (ref 6.4–8.3)
RBC # BLD AUTO: 4.8 10E6/UL (ref 4.4–5.9)
RBC #/AREA URNS AUTO: ABNORMAL /HPF
SODIUM SERPL-SCNC: 142 MMOL/L (ref 136–145)
SP GR UR STRIP: >=1.03 (ref 1–1.03)
SQUAMOUS #/AREA URNS AUTO: ABNORMAL /LPF
TRIGL SERPL-MCNC: 44 MG/DL
UROBILINOGEN UR STRIP-ACNC: 1 E.U./DL
WBC # BLD AUTO: 3.7 10E3/UL (ref 4–11)
WBC #/AREA URNS AUTO: ABNORMAL /HPF

## 2022-10-14 PROCEDURE — 36415 COLL VENOUS BLD VENIPUNCTURE: CPT | Performed by: FAMILY MEDICINE

## 2022-10-14 PROCEDURE — 80053 COMPREHEN METABOLIC PANEL: CPT | Performed by: FAMILY MEDICINE

## 2022-10-14 PROCEDURE — 80061 LIPID PANEL: CPT | Performed by: FAMILY MEDICINE

## 2022-10-14 PROCEDURE — 81001 URINALYSIS AUTO W/SCOPE: CPT | Performed by: FAMILY MEDICINE

## 2022-10-14 PROCEDURE — 99215 OFFICE O/P EST HI 40 MIN: CPT | Performed by: FAMILY MEDICINE

## 2022-10-14 PROCEDURE — 85027 COMPLETE CBC AUTOMATED: CPT | Performed by: FAMILY MEDICINE

## 2022-10-14 RX ORDER — ALBUTEROL SULFATE 90 UG/1
2 AEROSOL, METERED RESPIRATORY (INHALATION) EVERY 4 HOURS PRN
COMMUNITY
Start: 2022-03-09 | End: 2023-10-10

## 2022-10-14 NOTE — PROGRESS NOTES
"  Assessment & Plan     Paresthesia    - CBC with platelets  - CBC with platelets    Shifting sleep-work schedule    He will take melatonin as needed.      Mixed hyperlipidemia    - Comprehensive metabolic panel (BMP + Alb, Alk Phos, ALT, AST, Total. Bili, TP)  - Lipid panel reflex to direct LDL Fasting  - Comprehensive metabolic panel (BMP + Alb, Alk Phos, ALT, AST, Total. Bili, TP)  - Lipid panel reflex to direct LDL Fasting    Dysuria    - UA macro with reflex to Microscopic and Culture - Clinc Collect  - Urine Microscopic        40 minutes spent on the date of the encounter doing chart review, review of test results and patient visit regarding paresthesia, sleep, hyperlipidemia, dysuria.       Nicotine/Tobacco Cessation:  He reports that he has been smoking cigars. He has quit using smokeless tobacco.        BMI:   Estimated body mass index is 25.18 kg/m  as calculated from the following:    Height as of this encounter: 1.75 m (5' 8.9\").    Weight as of this encounter: 77.1 kg (170 lb).           No follow-ups on file.    Jovani Edgar MD  [facility]ahu is a 32 year old, presenting for the following health issues:  Recheck (ED F/U  10/12 Paresthesias ) and Medication Request (Lunesta)      HPI     He was at [facility] yesterday.    Decreased sensation of right medial knee and left lateral upper calf.    Bilateral low back pain/ tightness.    He works as an .  Rotating shifts day/night.    He took Lexapro in college.    Dysuria.  No urethral discharge.  No new partner.    Current Outpatient Medications   Medication Sig Dispense Refill     albuterol (PROAIR HFA/PROVENTIL HFA/VENTOLIN HFA) 108 (90 Base) MCG/ACT inhaler Inhale 2 puffs into the lungs every 4 hours as needed       vitamin D2 (ERGOCALCIFEROL) 68991 units (1250 mcg) capsule Take 50,000 Units by mouth once a week           Review of Systems   No fever or cough.  No muscle weakness.    " "  Objective    /64 (Cuff Size: Adult Regular)   Pulse 84   Temp 98.5  F (36.9  C) (Oral)   Ht 1.75 m (5' 8.9\")   Wt 77.1 kg (170 lb)   SpO2 98%   BMI 25.18 kg/m    Body mass index is 25.18 kg/m .  Physical Exam   Heart normal  Lungs normal  Legs normal except for decreased sensation of right medial knee.                    "

## 2023-05-07 ENCOUNTER — HEALTH MAINTENANCE LETTER (OUTPATIENT)
Age: 33
End: 2023-05-07

## 2023-06-01 ENCOUNTER — NURSE TRIAGE (OUTPATIENT)
Dept: FAMILY MEDICINE | Facility: CLINIC | Age: 33
End: 2023-06-01
Payer: OTHER GOVERNMENT

## 2023-06-01 NOTE — TELEPHONE ENCOUNTER
Nurse Triage SBAR    Is this a 2nd Level Triage? NO    Situation:   Patient calls reporting that he has been struggling with symptoms of depression and is requesting a referral to psychiatry.    Background:   Patient denies a history of depression. Reports that he has been feeling depressed over the past several weeks.    Assessment:   Patient reports feeling depressed, lack of motivation, difficulty concentrating, anxiety, and difficulty sleeping. Patient denies suicidal thoughts. Patient is frustrated that he needs a referral to see a psychiatrist.    Protocol Recommended Disposition:   SEE IN OFFICE WITHIN 3 DAYS    Recommendation:   Recommended that patient be seen by PCP for referral. Patient is frustrated that he needs to be seen by a provider before a referral is ordered, but agrees to appointment. Patient was scheduled a virtual appointment with PCP tomorrow afternoon:    Appointments in Next Year    Jun 02, 2023  3:00 PM  (Arrive by 2:40 PM)  Provider Visit with Kallie Young PA-C  [facility] ([facility] ) [phone]        Reviewed mental health resources including suicide hotline number if patient develops thoughts of self/harm suicide. Patient verbalized understanding and denies additional questions or concerns at this time.      Does the patient meet one of the following criteria for ADS visit consideration? 16+ years old, with an [facility] PCP     TIP  Providers, please consider if this condition is appropriate for management at one of our Acute and Diagnostic Services sites.     If patient is a good candidate, please use dotphrase <dot>triageresponse and select Refer to ADS to document.    Reason for Disposition    Depression is worsening (e.g.,sleeping poorly, less able to do activities of daily living)    Additional Information    Negative: Patient attempted suicide    Negative: Patient is threatening suicide now    Negative: Violent behavior, or  "threatening to physically hurt or kill someone    Negative: Patient is very confused (disoriented, slurred speech) and no other adult (e.g., friend or family member) available    Negative: Difficult to awaken or acting very confused (disoriented, slurred speech) and new-onset    Negative: Sounds like a life-threatening emergency to the triager    Negative: Suicide thoughts, threats, attempts, or questions    Negative: Questions or concerns about alcohol use, unhealthy alcohol use, binge drinking, intoxication, or withdrawal    Negative: Questions or concerns about substance use (drug use), unhealthy drug use, intoxication, or withdrawal    Negative: Anxiety is main problem or symptom    Negative: Depression and unable to do any of normal activities (e.g., self care, school, work; in comparison to baseline).    Negative: Very strange or confused behavior    Negative: Patient sounds very sick or weak to the triager    Negative: Fever > 101 F  (38.3 C)    Negative: Sometimes has thoughts of suicide    Negative: Symptoms interfere with work or school    Answer Assessment - Initial Assessment Questions  1. CONCERN: \"What happened that made you call today?\"      Patient states that he cannot make appointment with psychiatrist than going to PCP.  2. DEPRESSION SYMPTOM SCREENING: \"How are you feeling overall?\" (e.g., decreased energy, increased sleeping or difficulty sleeping, difficulty concentrating, feelings of sadness, guilt, hopelessness, or worthlessness)      Anxiety, trouble sleeping, decreased energy, difficulty concentrating.  3. RISK OF HARM - SUICIDAL IDEATION:  \"Do you ever have thoughts of hurting or killing yourself?\"  (e.g., yes, no, no but preoccupation with thoughts about death)    - INTENT:  \"Do you have thoughts of hurting or killing yourself right NOW?\" (e.g., yes, no, N/A)    - PLAN: \"Do you have a specific plan for how you would do this?\" (e.g., gun, knife, overdose, no plan, N/A)      No.  4. RISK OF " "HARM - HOMICIDAL IDEATION:  \"Do you ever have thoughts of hurting or killing someone else?\"  (e.g., yes, no, no but preoccupation with thoughts about death)    - INTENT:  \"Do you have thoughts of hurting or killing someone right NOW?\" (e.g., yes, no, N/A)    - PLAN: \"Do you have a specific plan for how you would do this?\" (e.g., gun, knife, no plan, N/A)       No.  5. FUNCTIONAL IMPAIRMENT: \"How have things been going for you overall? Have you had more difficulty than usual doing your normal daily activities?\"  (e.g., better, same, worse; self-care, school, work, interactions)      Some days good and some bad days, lacking motivation at times.  6. SUPPORT: \"Who is with you now?\" \"Who do you live with?\" \"Do you have family or friends who you can talk to?\"       Friends, mother, brother.  7. THERAPIST: \"Do you have a counselor or therapist? Name?\"      No.  8. STRESSORS: \"Has there been any new stress or recent changes in your life?\"      Patient thinks it may be a combination of work related stress and other things in life.  9. ALCOHOL USE OR SUBSTANCE USE (DRUG USE): \"Do you drink alcohol or use any illegal drugs?\"      Occasional alcohol use.  10. OTHER: \"Do you have any other physical symptoms right now?\" (e.g., fever)        No.  11. PREGNANCY: \"Is there any chance you are pregnant?\" \"When was your last menstrual period?\"        N/A    Protocols used: DEPRESSION-A-OH    Carey CHANCE RN, BSN, PHN      "

## 2023-06-02 ENCOUNTER — VIRTUAL VISIT (OUTPATIENT)
Dept: FAMILY MEDICINE | Facility: CLINIC | Age: 33
End: 2023-06-02
Payer: OTHER GOVERNMENT

## 2023-06-02 DIAGNOSIS — F33.0 MILD EPISODE OF RECURRENT MAJOR DEPRESSIVE DISORDER (H): Primary | ICD-10-CM

## 2023-06-02 DIAGNOSIS — G47.00 INSOMNIA, UNSPECIFIED TYPE: ICD-10-CM

## 2023-06-02 DIAGNOSIS — F41.9 ANXIETY: ICD-10-CM

## 2023-06-02 PROCEDURE — 99214 OFFICE O/P EST MOD 30 MIN: CPT | Mod: VID | Performed by: PHYSICIAN ASSISTANT

## 2023-06-02 RX ORDER — ESZOPICLONE 1 MG/1
1 TABLET, FILM COATED ORAL
Qty: 10 TABLET | Refills: 0 | Status: SHIPPED | OUTPATIENT
Start: 2023-06-02 | End: 2023-10-05

## 2023-06-02 ASSESSMENT — ANXIETY QUESTIONNAIRES
5. BEING SO RESTLESS THAT IT IS HARD TO SIT STILL: NEARLY EVERY DAY
7. FEELING AFRAID AS IF SOMETHING AWFUL MIGHT HAPPEN: NOT AT ALL
1. FEELING NERVOUS, ANXIOUS, OR ON EDGE: SEVERAL DAYS
2. NOT BEING ABLE TO STOP OR CONTROL WORRYING: NOT AT ALL
GAD7 TOTAL SCORE: 8
IF YOU CHECKED OFF ANY PROBLEMS ON THIS QUESTIONNAIRE, HOW DIFFICULT HAVE THESE PROBLEMS MADE IT FOR YOU TO DO YOUR WORK, TAKE CARE OF THINGS AT HOME, OR GET ALONG WITH OTHER PEOPLE: NOT DIFFICULT AT ALL
GAD7 TOTAL SCORE: 8
6. BECOMING EASILY ANNOYED OR IRRITABLE: SEVERAL DAYS
3. WORRYING TOO MUCH ABOUT DIFFERENT THINGS: NOT AT ALL

## 2023-06-02 ASSESSMENT — PATIENT HEALTH QUESTIONNAIRE - PHQ9
SUM OF ALL RESPONSES TO PHQ QUESTIONS 1-9: 9
5. POOR APPETITE OR OVEREATING: NEARLY EVERY DAY

## 2023-06-02 NOTE — PROGRESS NOTES
Luis Alberto is a 33 year old who is being evaluated via a billable video visit.      How would you like to obtain your AVS? MyChart  If the video visit is dropped, the invitation should be resent by: Text to cell phone: [phone]  Will anyone else be joining your video visit? No          Assessment & Plan     Mild episode of recurrent major depressive disorder (H)  Anxiety  Concerns with depression over the past 2-3 months.   Increased stress with his job, difficulties with his manager.  Felt like turning 33 kind of triggered his symptoms - feeling sad about getting older  History of depression which he felt was related to poor sleep.  Lexapro and lunesta in his early 20s; had sexual side effects with lexapro.  Felt like sleep was really the problem and once he was able to get sleep back on track, mood improved.  Concern with sexual side effects of mood medications  Open to therapy, referral placed  - Adult Mental Health  Referral; Future    Insomnia, unspecified type  Currently struggling with sleep, falling asleep, staying asleep.  He used lunesta in the past and says it really helped. He says he has tried melatonin and establishing good sleep habits, but is really struggling with sleep currently. He would like to have lunesta again. We discussed other options for sleep, including CBT-I, melatonin, hydroxyzine if anxiety seems to be triggering insomnia, trazodone. He does not want to try a different medication. Will give small supply of lunesta, but discussed that this should recommended for long term use and if he is needing it more frequently, would advise other options as discussed or evaluation with sleep clinic.  - eszopiclone (LUNESTA) 1 MG tablet; Take 1 tablet (1 mg) by mouth nightly as needed for sleep       Kallie Young PA-C  [facility]    Subjective   Luis Alberto is a 33 year old, presenting for the following health issues:  Depression and Referral (Psychiatry )         6/2/2023     2:22 PM   Additional Questions   Roomed by CHARLA Franco     [facility]     Abnormal Mood Symptoms  Onset/Duration: 2-3 months ago   Description: Jittery, restlessness, feeling down, sad  Depression (if yes, do PHQ-9): YES  Anxiety (if yes, do ANJALI-7): YES  Accompanying Signs & Symptoms:  Still participating in activities that you used to enjoy: YES  Fatigue: YES- some days, but not often   Irritability: YES  Difficulty concentrating: No  Changes in appetite: No  Problems with sleep: YES  Heart racing/beating fast: No  Abnormally elevated, expansive, or irritable mood: No  Persistently increased activity or energy: No  Thoughts of hurting yourself or others: No  History:  Recent stress or major life event: No  Prior depression or anxiety: Depression - about 16 years ago   Family history of depression or anxiety: No  Alcohol/drug use: No  Difficulty sleeping: YES  Precipitating or alleviating factors: Things on the job makes it worse  Therapies tried and outcome: Deep breathing, exercise       6/2/2023     2:30 PM   PHQ   PHQ-9 Total Score 9   Q9: Thoughts of better off dead/self-harm past 2 weeks Not at all         6/2/2023     2:30 PM   ANJALI-7 SCORE   Total Score 8      Concerns with depression over the past 2-3 months.   Increased stress with his job, difficulties with his manager.  Felt like turning 33 kind of triggered his symptoms - feeling sad about getting older  Patient reports feeling depressed, lack of motivation, difficulty concentrating, anxiety, and difficulty sleeping. Patient denies suicidal thoughts.    History of depression which he felt was related to poor sleep.  Lexapro and lunesta in his early 20s; took for 2-3 weeks; had sexual side effects with lexapro.  Felt like sleep was really the problem and once he was able to get sleep back on track, mood improved.    Currently struggling with sleep, falling asleep, staying asleep.  He used lunesta in the past and says it really helped. He  says he has tried melatonin and establishing good sleep habits, but is really struggling with sleep currently. He would like to have lunesta again.      Review of Systems   Constitutional, HEENT, cardiovascular, pulmonary, gi and gu systems are negative, except as otherwise noted.      Objective           Vitals:  No vitals were obtained today due to virtual visit.    Physical Exam   GENERAL: Healthy, alert and no distress  EYES: Eyes grossly normal to inspection.  No discharge or erythema, or obvious scleral/conjunctival abnormalities.  RESP: No audible wheeze, cough, or visible cyanosis.  No visible retractions or increased work of breathing.    SKIN: Visible skin clear. No significant rash, abnormal pigmentation or lesions.  NEURO: Cranial nerves grossly intact.  Mentation and speech appropriate for age.  PSYCH: Mentation appears normal, affect normal/bright, judgement and insight intact, normal speech and appearance well-groomed.      Video-Visit Details    Type of service:  Video Visit     Originating Location (pt. Location): Home    Distant Location (provider location):  On-site  Platform used for Video Visit: Raymond

## 2023-07-03 ENCOUNTER — E-VISIT (OUTPATIENT)
Dept: FAMILY MEDICINE | Facility: CLINIC | Age: 33
End: 2023-07-03
Payer: OTHER GOVERNMENT

## 2023-07-03 ENCOUNTER — TELEPHONE (OUTPATIENT)
Dept: FAMILY MEDICINE | Facility: CLINIC | Age: 33
End: 2023-07-03
Payer: OTHER GOVERNMENT

## 2023-07-03 DIAGNOSIS — E55.9 VITAMIN D DEFICIENCY: Primary | ICD-10-CM

## 2023-07-03 PROCEDURE — 99421 OL DIG E/M SVC 5-10 MIN: CPT | Performed by: PHYSICIAN ASSISTANT

## 2023-07-03 NOTE — TELEPHONE ENCOUNTER
Medication Question or Refill        What medication are you calling about (include dose and sig)?:   vitamin D2 (ERGOCALCIFEROL) 60455 units (1250 mcg) capsule Take 50,000 Units by mouth once a week            Preferred Pharmacy:   [id number] - [place], [place] - [address] AT [facility] [address]  [address]  [facility] [address]  Phone: [phone] Fax: [phone]      Controlled Substance Agreement on file:   CSA -- Patient Level:    CSA: None found at the patient level.       Who prescribed the medication?: PCP    Do you need a refill? Yes, pt stated he would like this refilled as soon as possible    When did you use the medication last? NA    Patient offered an appointment? No    Do you have any questions or concerns?  No      Could we send this information to you in [facility] or would you prefer to receive a phone call?:   Patient would prefer a phone call   Okay to leave a detailed message?: No at Cell number on file:    Telephone Information:   Mobile [phone]

## 2023-07-03 NOTE — TELEPHONE ENCOUNTER
We have not prescribed vitamin D before - it was pt reported.      He has not had a vitamin D checked in his records here.     Pt would need some kind of visit for this - will forward to the station.   Please try to help schedule - could be video, in person, telephone or e-visit if needed.      See also 3/7/22 refill.

## 2023-07-05 NOTE — PATIENT INSTRUCTIONS
Thank you for choosing us for your care. Given your symptoms, I would like you to do a lab-only visit to determine what is causing them.  I have placed the orders.  Please schedule an appointment with the lab right here [place], or call [phone].  I will let you know when the results are back and next steps to take.

## 2023-07-19 ENCOUNTER — TELEPHONE (OUTPATIENT)
Dept: FAMILY MEDICINE | Facility: CLINIC | Age: 33
End: 2023-07-19
Payer: OTHER GOVERNMENT

## 2023-07-19 NOTE — TELEPHONE ENCOUNTER
Patient called for [facility]. He asked if UC does blood transfusions. Writer advised that transfusions are done either at the ED or in infusion center. Pt had to take another call. Call ended, unable to get any additional information.     Sherice Harris RN [facility]

## 2023-10-05 ENCOUNTER — OFFICE VISIT (OUTPATIENT)
Dept: FAMILY MEDICINE | Facility: CLINIC | Age: 33
End: 2023-10-05
Payer: OTHER GOVERNMENT

## 2023-10-05 VITALS
HEIGHT: 70 IN | TEMPERATURE: 98 F | DIASTOLIC BLOOD PRESSURE: 82 MMHG | HEART RATE: 58 BPM | RESPIRATION RATE: 15 BRPM | SYSTOLIC BLOOD PRESSURE: 133 MMHG | WEIGHT: 184.25 LBS | OXYGEN SATURATION: 100 % | BODY MASS INDEX: 26.38 KG/M2

## 2023-10-05 DIAGNOSIS — Z00.00 ROUTINE GENERAL MEDICAL EXAMINATION AT A HEALTH CARE FACILITY: Primary | ICD-10-CM

## 2023-10-05 DIAGNOSIS — E55.9 VITAMIN D DEFICIENCY: ICD-10-CM

## 2023-10-05 LAB
ALBUMIN SERPL BCG-MCNC: 4.4 G/DL (ref 3.5–5.2)
ALP SERPL-CCNC: 39 U/L (ref 40–129)
ALT SERPL W P-5'-P-CCNC: 18 U/L (ref 0–70)
ANION GAP SERPL CALCULATED.3IONS-SCNC: 9 MMOL/L (ref 7–15)
AST SERPL W P-5'-P-CCNC: 25 U/L (ref 0–45)
BILIRUB SERPL-MCNC: 1.2 MG/DL
BUN SERPL-MCNC: 14.9 MG/DL (ref 6–20)
CALCIUM SERPL-MCNC: 9.2 MG/DL (ref 8.6–10)
CHLORIDE SERPL-SCNC: 107 MMOL/L (ref 98–107)
CHOLEST SERPL-MCNC: 222 MG/DL
CREAT SERPL-MCNC: 1.33 MG/DL (ref 0.67–1.17)
DEPRECATED HCO3 PLAS-SCNC: 27 MMOL/L (ref 22–29)
EGFRCR SERPLBLD CKD-EPI 2021: 71 ML/MIN/1.73M2
ERYTHROCYTE [DISTWIDTH] IN BLOOD BY AUTOMATED COUNT: 11.5 % (ref 10–15)
GLUCOSE SERPL-MCNC: 91 MG/DL (ref 70–99)
HCT VFR BLD AUTO: 45.5 % (ref 40–53)
HDLC SERPL-MCNC: 63 MG/DL
HGB BLD-MCNC: 15 G/DL (ref 13.3–17.7)
LDLC SERPL CALC-MCNC: 146 MG/DL
MCH RBC QN AUTO: 32.8 PG (ref 26.5–33)
MCHC RBC AUTO-ENTMCNC: 33 G/DL (ref 31.5–36.5)
MCV RBC AUTO: 100 FL (ref 78–100)
NONHDLC SERPL-MCNC: 159 MG/DL
PLATELET # BLD AUTO: 236 10E3/UL (ref 150–450)
POTASSIUM SERPL-SCNC: 4.7 MMOL/L (ref 3.4–5.3)
PROT SERPL-MCNC: 6.6 G/DL (ref 6.4–8.3)
RBC # BLD AUTO: 4.57 10E6/UL (ref 4.4–5.9)
SODIUM SERPL-SCNC: 143 MMOL/L (ref 135–145)
TRIGL SERPL-MCNC: 67 MG/DL
VIT D+METAB SERPL-MCNC: 26 NG/ML (ref 20–50)
WBC # BLD AUTO: 4.9 10E3/UL (ref 4–11)

## 2023-10-05 PROCEDURE — 85027 COMPLETE CBC AUTOMATED: CPT | Performed by: FAMILY MEDICINE

## 2023-10-05 PROCEDURE — 80053 COMPREHEN METABOLIC PANEL: CPT | Performed by: FAMILY MEDICINE

## 2023-10-05 PROCEDURE — 99395 PREV VISIT EST AGE 18-39: CPT | Performed by: FAMILY MEDICINE

## 2023-10-05 PROCEDURE — 80061 LIPID PANEL: CPT | Performed by: FAMILY MEDICINE

## 2023-10-05 PROCEDURE — 36415 COLL VENOUS BLD VENIPUNCTURE: CPT | Performed by: FAMILY MEDICINE

## 2023-10-05 PROCEDURE — 82306 VITAMIN D 25 HYDROXY: CPT | Performed by: FAMILY MEDICINE

## 2023-10-05 RX ORDER — VILAZODONE HYDROCHLORIDE 20 MG/1
20 TABLET ORAL
COMMUNITY
Start: 2023-08-28

## 2023-10-05 RX ORDER — ERGOCALCIFEROL 1.25 MG/1
50000 CAPSULE, LIQUID FILLED ORAL WEEKLY
Status: CANCELLED | OUTPATIENT
Start: 2023-10-05

## 2023-10-05 SDOH — HEALTH STABILITY: PHYSICAL HEALTH: ON AVERAGE, HOW MANY MINUTES DO YOU ENGAGE IN EXERCISE AT THIS LEVEL?: 30 MIN

## 2023-10-05 SDOH — HEALTH STABILITY: PHYSICAL HEALTH: ON AVERAGE, HOW MANY DAYS PER WEEK DO YOU ENGAGE IN MODERATE TO STRENUOUS EXERCISE (LIKE A BRISK WALK)?: 4 DAYS

## 2023-10-05 ASSESSMENT — ENCOUNTER SYMPTOMS
DYSURIA: 0
PALPITATIONS: 0
HEARTBURN: 0
HEMATURIA: 0
NAUSEA: 0
DIARRHEA: 0
HEADACHES: 0
SHORTNESS OF BREATH: 0
EYE PAIN: 0
FREQUENCY: 0
HEMATOCHEZIA: 0
FEVER: 0
NERVOUS/ANXIOUS: 0
ABDOMINAL PAIN: 0
ARTHRALGIAS: 0
JOINT SWELLING: 0
DIZZINESS: 0
PARESTHESIAS: 0
CONSTIPATION: 0
COUGH: 0
CHILLS: 0
MYALGIAS: 0
WEAKNESS: 0
SORE THROAT: 0

## 2023-10-05 ASSESSMENT — PATIENT HEALTH QUESTIONNAIRE - PHQ9
SUM OF ALL RESPONSES TO PHQ QUESTIONS 1-9: 0
10. IF YOU CHECKED OFF ANY PROBLEMS, HOW DIFFICULT HAVE THESE PROBLEMS MADE IT FOR YOU TO DO YOUR WORK, TAKE CARE OF THINGS AT HOME, OR GET ALONG WITH OTHER PEOPLE: NOT DIFFICULT AT ALL
SUM OF ALL RESPONSES TO PHQ QUESTIONS 1-9: 0

## 2023-10-05 ASSESSMENT — SOCIAL DETERMINANTS OF HEALTH (SDOH)
HOW OFTEN DO YOU GET TOGETHER WITH FRIENDS OR RELATIVES?: MORE THAN THREE TIMES A WEEK
ARE YOU MARRIED, WIDOWED, DIVORCED, SEPARATED, NEVER MARRIED, OR LIVING WITH A PARTNER?: PATIENT DECLINED
HOW OFTEN DO YOU ATTENT MEETINGS OF THE CLUB OR ORGANIZATION YOU BELONG TO?: PATIENT DECLINED
IN A TYPICAL WEEK, HOW MANY TIMES DO YOU TALK ON THE PHONE WITH FAMILY, FRIENDS, OR NEIGHBORS?: MORE THAN THREE TIMES A WEEK
HOW OFTEN DO YOU ATTEND CHURCH OR RELIGIOUS SERVICES?: PATIENT DECLINED
DO YOU BELONG TO ANY CLUBS OR ORGANIZATIONS SUCH AS CHURCH GROUPS UNIONS, FRATERNAL OR ATHLETIC GROUPS, OR SCHOOL GROUPS?: YES

## 2023-10-05 ASSESSMENT — LIFESTYLE VARIABLES
HOW OFTEN DO YOU HAVE A DRINK CONTAINING ALCOHOL: MONTHLY OR LESS
AUDIT-C TOTAL SCORE: 1
HOW MANY STANDARD DRINKS CONTAINING ALCOHOL DO YOU HAVE ON A TYPICAL DAY: 1 OR 2
HOW OFTEN DO YOU HAVE SIX OR MORE DRINKS ON ONE OCCASION: NEVER
SKIP TO QUESTIONS 9-10: 1

## 2023-10-05 NOTE — PROGRESS NOTES
SUBJECTIVE:   CC: Luis Alberto is an 33 year old who presents for preventative health visit.       10/5/2023    10:27 AM   Additional Questions   Roomed by Missy   Accompanied by Self       Healthy Habits:     Getting at least 3 servings of Calcium per day:  Yes    Bi-annual eye exam:  Yes    Dental care twice a year:  Yes    Sleep apnea or symptoms of sleep apnea:  None    Diet:  Low fat/cholesterol    Frequency of exercise:  2-3 days/week    Duration of exercise:  45-60 minutes    Taking medications regularly:  Yes    Medication side effects:  None    Additional concerns today:  No      Today's PHQ-9 Score:       10/5/2023    10:01 AM   PHQ-9 SCORE   PHQ-9 Total Score MyChart 0   PHQ-9 Total Score 0                   Have you ever done Advance Care Planning? (For example, a Health Directive, POLST, or a discussion with a medical provider or your loved ones about your wishes): No, advance care planning information given to patient to review.  Patient plans to discuss their wishes with loved ones or provider.      Social History     Tobacco Use    Smoking status: Some Days     Types: Cigars    Smokeless tobacco: Former   Substance Use Topics    Alcohol use: Yes             10/5/2023    10:06 AM   Alcohol Use   Prescreen: >3 drinks/day or >7 drinks/week? No       Last PSA:   Prostate Specific Antigen Screen   Date Value Ref Range Status   12/07/2021 0.61 0.00 - 4.00 ug/L Final       Reviewed orders with patient. Reviewed health maintenance and updated orders accordingly - Yes  Patient Active Problem List   Diagnosis    Gilbert's syndrome    Mild episode of recurrent major depressive disorder (H24)    Anxiety    Insomnia, unspecified type     Past Surgical History:   Procedure Laterality Date    VARICOCELE EXCISION Bilateral        Social History     Tobacco Use    Smoking status: Some Days     Types: Cigars    Smokeless tobacco: Former   Substance Use Topics    Alcohol use: Yes     Family History   Problem Relation Age of  Onset    Diverticulitis Mother     Diverticulitis Maternal Grandmother     Glaucoma Maternal Grandmother     Asthma Maternal Grandfather     Glaucoma Maternal Uncle     Glaucoma Maternal Aunt          Current Outpatient Medications   Medication Sig Dispense Refill    vilazodone (VIIBRYD) 20 MG TABS tablet Take 20 mg by mouth daily with food      albuterol (PROAIR HFA/PROVENTIL HFA/VENTOLIN HFA) 108 (90 Base) MCG/ACT inhaler Inhale 2 puffs into the lungs every 4 hours as needed      vitamin D2 (ERGOCALCIFEROL) 60486 units (1250 mcg) capsule Take 50,000 Units by mouth once a week       No Known Allergies    Reviewed and updated as needed this visit by clinical staff   Tobacco  Allergies  Meds              Reviewed and updated as needed this visit by Provider                 Past Medical History:   Diagnosis Date    Acute renal insufficiency 12/7/2021    Hematuria 12/7/2021    Leukopenia 12/7/2021    Rhabdomyolysis 12/7/2021      Past Surgical History:   Procedure Laterality Date    VARICOCELE EXCISION Bilateral        Review of Systems   Constitutional:  Negative for chills and fever.   HENT:  Negative for congestion, ear pain, hearing loss and sore throat.    Eyes:  Negative for pain and visual disturbance.   Respiratory:  Negative for cough and shortness of breath.    Cardiovascular:  Negative for chest pain, palpitations and peripheral edema.   Gastrointestinal:  Negative for abdominal pain, constipation, diarrhea, heartburn, hematochezia and nausea.   Genitourinary:  Negative for dysuria, frequency, genital sores, hematuria, impotence, penile discharge and urgency.   Musculoskeletal:  Negative for arthralgias, joint swelling and myalgias.   Skin:  Negative for rash.   Neurological:  Negative for dizziness, weakness, headaches and paresthesias.   Psychiatric/Behavioral:  Negative for mood changes. The patient is not nervous/anxious.          OBJECTIVE:   /82 (BP Location: Right arm, Patient Position:  "Sitting, Cuff Size: Adult Regular)   Pulse 58   Temp 98  F (36.7  C) (Oral)   Resp 15   Ht 1.765 m (5' 9.5\")   Wt 83.6 kg (184 lb 4 oz)   SpO2 100%   BMI 26.82 kg/m      Physical Exam  GENERAL: healthy, alert and no distress  EYES: Eyes grossly normal to inspection, PERRL and conjunctivae and sclerae normal  HENT: ear canals and TM's normal, nose and mouth without ulcers or lesions  NECK: no adenopathy, no asymmetry, masses, or scars and thyroid normal to palpation  RESP: lungs clear to auscultation - no rales, rhonchi or wheezes  CV: regular rate and rhythm, normal S1 S2, no S3 or S4, no murmur, click or rub, no peripheral edema and peripheral pulses strong  ABDOMEN: soft, nontender, no hepatosplenomegaly, no masses and bowel sounds normal  MS: no gross musculoskeletal defects noted, no edema  SKIN: no suspicious lesions or rashes  NEURO: Normal strength and tone, mentation intact and speech normal  PSYCH: mentation appears normal, affect normal/bright        ASSESSMENT/PLAN:   (Z00.00) Routine general medical examination at a health care facility  (primary encounter diagnosis)  Comment: doing excellent, pt prefer to do labs on yearly basis.   Depression is well controlled, seeing  psychiatrist.   Plan: Vitamin D Deficiency, Comprehensive metabolic         panel (BMP + Alb, Alk Phos, ALT, AST, Total.         Bili, TP), CBC with platelets, Lipid panel         reflex to direct LDL Fasting            (E55.9) Vitamin D deficiency  Comment: check vit D levelPlan:           COUNSELING:   Reviewed preventive health counseling, as reflected in patient instructions       Healthy diet/nutrition      BMI:   Estimated body mass index is 26.82 kg/m  as calculated from the following:    Height as of this encounter: 1.765 m (5' 9.5\").    Weight as of this encounter: 83.6 kg (184 lb 4 oz).   Weight management plan: healthy diet.      He reports that he has been smoking cigars. He has quit using smokeless " tobacco.  Nicotine/Tobacco Cessation Plan:   Not discussed today.            Arron Arzate MD  [facility] submitted by the patient for this visit:  Patient Health Questionnaire (Submitted on 10/5/2023)  If you checked off any problems, how difficult have these problems made it for you to do your work, take care of things at home, or get along with other people?: Not difficult at all  PHQ9 TOTAL SCORE: 0

## 2023-10-09 ENCOUNTER — NURSE TRIAGE (OUTPATIENT)
Dept: FAMILY MEDICINE | Facility: CLINIC | Age: 33
End: 2023-10-09
Payer: OTHER GOVERNMENT

## 2023-10-09 ENCOUNTER — TELEPHONE (OUTPATIENT)
Dept: FAMILY MEDICINE | Facility: CLINIC | Age: 33
End: 2023-10-09
Payer: OTHER GOVERNMENT

## 2023-10-09 ENCOUNTER — TELEPHONE (OUTPATIENT)
Dept: BEHAVIORAL HEALTH | Facility: CLINIC | Age: 33
End: 2023-10-09
Payer: OTHER GOVERNMENT

## 2023-10-09 ASSESSMENT — PATIENT HEALTH QUESTIONNAIRE - PHQ9
5. POOR APPETITE OR OVEREATING: NOT AT ALL
SUM OF ALL RESPONSES TO PHQ QUESTIONS 1-9: 0

## 2023-10-09 ASSESSMENT — ANXIETY QUESTIONNAIRES
2. NOT BEING ABLE TO STOP OR CONTROL WORRYING: NOT AT ALL
7. FEELING AFRAID AS IF SOMETHING AWFUL MIGHT HAPPEN: SEVERAL DAYS
IF YOU CHECKED OFF ANY PROBLEMS ON THIS QUESTIONNAIRE, HOW DIFFICULT HAVE THESE PROBLEMS MADE IT FOR YOU TO DO YOUR WORK, TAKE CARE OF THINGS AT HOME, OR GET ALONG WITH OTHER PEOPLE: NOT DIFFICULT AT ALL
6. BECOMING EASILY ANNOYED OR IRRITABLE: NOT AT ALL
1. FEELING NERVOUS, ANXIOUS, OR ON EDGE: SEVERAL DAYS
GAD7 TOTAL SCORE: 2
3. WORRYING TOO MUCH ABOUT DIFFERENT THINGS: NOT AT ALL
5. BEING SO RESTLESS THAT IT IS HARD TO SIT STILL: NOT AT ALL
GAD7 TOTAL SCORE: 2

## 2023-10-09 NOTE — TELEPHONE ENCOUNTER
Patient spoke with triage nurse. [facility] services were requested / offered. No immediate safety/risk issues were reported or identified.  Explained the role of the [facility] and provided scheduling information.     Ade Villaseñor [facility], Behavioral Health Clinician

## 2023-10-09 NOTE — TELEPHONE ENCOUNTER
PHQ-2 Score:         4/12/2022     8:50 AM 12/7/2021    11:11 AM   PHQ-2 ( 1999 [employer])   Q1: Little interest or pleasure in doing things 0 0   Q2: Feeling down, depressed or hopeless 0 0   PHQ-2 Score 0 0   Q1: Little interest or pleasure in doing things Not at all Not at all   Q2: Feeling down, depressed or hopeless Not at all Not at all   PHQ-2 Score 0 0           6/2/2023     2:30 PM 10/5/2023    10:01 AM   PHQ   PHQ-9 Total Score 9 0   Q9: Thoughts of better off dead/self-harm past 2 weeks Not at all Not at all      Reason for Disposition    Patient wants to be seen    Additional Information    Negative: Patient attempted suicide    Negative: Patient is threatening suicide now    Negative: Violent behavior, or threatening to physically hurt or kill someone    Negative: Patient is very confused (disoriented, slurred speech) and no other adult (e.g., friend or family member) available    Negative: Difficult to awaken or acting very confused (disoriented, slurred speech) and new-onset    Negative: Sounds like a life-threatening emergency to the triager    Negative: Suicide thoughts, threats, attempts, or questions    Negative: Questions or concerns about alcohol use, unhealthy alcohol use, binge drinking, intoxication, or withdrawal    Negative: Questions or concerns about substance use (drug use), unhealthy drug use, intoxication, or withdrawal    Negative: Anxiety is main problem or symptom    Negative: Depression and unable to do any of normal activities (e.g., self care, school, work; in comparison to baseline).    Negative: Very strange or confused behavior    Negative: Patient sounds very sick or weak to the triager    Negative: Fever > 101 F  (38.3 C)    Negative: Sometimes has thoughts of suicide    Negative: Symptoms interfere with work or school    Negative: Depression is worsening (e.g.,sleeping poorly, less able to do activities of daily living)    Negative: Significant weight loss (> 10 pounds or  5 kg) and not dieting    Negative: Substance use (drug use) or misuse, known or suspected    Negative: Unhealthy alcohol use, known or suspected    Negative: New or changed psychiatric medications > 2 weeks ago and not feeling any better    Negative: Requesting to talk with a counselor (mental health worker, psychiatrist, etc.)    Protocols used: Depression-A-OH

## 2023-10-09 NOTE — TELEPHONE ENCOUNTER
"  S: Depression/Decreased libido  B: Last June started vilazodone. Takes 20mg daily. Historical     A: Still able to do activities of daily living's. Most concerned about decreased libido over past month. Worse over last weekend. Still able to perform sexually but \"just don't feel like it\".    Over past two weeks had a few episodes of anxiety related to lack of adequate sleep.    Has sought out counseling but has not found a good fit.    Denies: plans for self harm, suicide, or harm to others.    PHQ-9 today - 0  ANJALI-7 today - 2 related to lack of sleep over past two weeks.        6/2/2023     2:30 PM 10/5/2023    10:01 AM 10/9/2023    10:51 AM   PHQ   PHQ-9 Total Score 9 0 0   Q9: Thoughts of better off dead/self-harm past 2 weeks Not at all Not at all Not at all          6/2/2023     2:30 PM 10/9/2023    10:51 AM   ANJALI-7 SCORE   Total Score 8 2      R: Video visit scheduled with Primary Care Provider tomorrow, 10/10/23    Spoke with patient about meeting with MIRIAM Smith. Patient agrees.    Micaela Harris RN       "

## 2023-10-09 NOTE — TELEPHONE ENCOUNTER
Patient Returning Call    Reason for call:  Patient has a question he would like a call back     Information relayed to patient:  will leave message for call back     Patient has additional questions:  No      Could we send this information to you in [employer] or would you prefer to receive a phone call?:   Patient would prefer a phone call   Okay to leave a detailed message?: Yes at Cell number on file:    Telephone Information:   Mobile [phone]

## 2023-10-10 ENCOUNTER — VIRTUAL VISIT (OUTPATIENT)
Dept: FAMILY MEDICINE | Facility: CLINIC | Age: 33
End: 2023-10-10
Payer: OTHER GOVERNMENT

## 2023-10-10 DIAGNOSIS — F52.0 DECREASED SEXUAL DESIRE: Primary | ICD-10-CM

## 2023-10-10 PROCEDURE — 99214 OFFICE O/P EST MOD 30 MIN: CPT | Mod: VID | Performed by: FAMILY MEDICINE

## 2023-10-10 RX ORDER — TADALAFIL 10 MG/1
10 TABLET ORAL DAILY PRN
Qty: 30 TABLET | Refills: 1 | Status: SHIPPED | OUTPATIENT
Start: 2023-10-10 | End: 2024-06-14

## 2023-10-10 ASSESSMENT — ANXIETY QUESTIONNAIRES
6. BECOMING EASILY ANNOYED OR IRRITABLE: SEVERAL DAYS
1. FEELING NERVOUS, ANXIOUS, OR ON EDGE: MORE THAN HALF THE DAYS
7. FEELING AFRAID AS IF SOMETHING AWFUL MIGHT HAPPEN: NOT AT ALL
GAD7 TOTAL SCORE: 3
3. WORRYING TOO MUCH ABOUT DIFFERENT THINGS: NOT AT ALL
5. BEING SO RESTLESS THAT IT IS HARD TO SIT STILL: NOT AT ALL
IF YOU CHECKED OFF ANY PROBLEMS ON THIS QUESTIONNAIRE, HOW DIFFICULT HAVE THESE PROBLEMS MADE IT FOR YOU TO DO YOUR WORK, TAKE CARE OF THINGS AT HOME, OR GET ALONG WITH OTHER PEOPLE: SOMEWHAT DIFFICULT
2. NOT BEING ABLE TO STOP OR CONTROL WORRYING: NOT AT ALL
GAD7 TOTAL SCORE: 3
4. TROUBLE RELAXING: NOT AT ALL

## 2023-10-10 NOTE — PROGRESS NOTES
"Luis Alberto is a 33 year old who is being evaluated via a billable video visit.      How would you like to obtain your AVS? MyChart  If the video visit is dropped, the invitation should be resent by: Text to cell phone: [phone]  Will anyone else be joining your video visit? No          Assessment & Plan     Decreased sexual desire  Mostly related to depression, discussed psychotherapy, pt is scheduled for therapy soon. Meanwhile, may use cialis to help with sexual activity with his current partner.  - Testosterone, total; Future  - tadalafil (CIALIS) 10 MG tablet; Take 1 tablet (10 mg) by mouth daily as needed (sexual activity)      32 minutes spent by me on the date of the encounter doing chart review, history and exam, documentation and further activities per the note           Arron Arzate MD  [facility]   Luis Alberto is a 33 year old, presenting for the following health issues:  Sexual Problem (Decreased Libido over a month now/Sex drive not as it used to be, linked to depression?)      10/10/2023     4:40 PM   Additional Questions   Roomed by nguyen RIVERA     Pt has hx of depression in the past 6 months ago, and he was treated with [facility] for his depression, his symptoms mainly presented with sleep problem, and decrease in sex drive.   Since treatment, he felt better in general, but his sex drive is still low, pt does not feel the urge for sex the way that he used to be, it was after his 33 years old birthday, he felt that he is older and does not a family yet.  When he is with a partner, he           Review of Systems         Objective    Vitals - Patient Reported  Systolic (Patient Reported): 130  Diastolic (Patient Reported): 80  Weight (Patient Reported): 80.7 kg (178 lb)  Height (Patient Reported): 175.3 cm (5' 9\")  BMI (Based on Pt Reported Ht/Wt): 26.29  Temperature (Patient Reported): 98  F (36.7  C)        Physical Exam   GENERAL: Healthy, alert and no " distress  PSYCH: Mentation appears normal, affect normal/bright, judgement and insight intact, normal speech and appearance well-groomed.                Video-Visit Details    Type of service:  Video Visit     Originating Location (pt. Location): Home    Distant Location (provider location):  On-site  Platform used for Video Visit: MiteshWell    .undefined[^^Answers submitted by the patient for this visit:  ANJALI-7 (Submitted on 10/10/2023)  ANJALI 7 TOTAL SCORE: 3

## 2023-10-12 ENCOUNTER — LAB (OUTPATIENT)
Dept: LAB | Facility: CLINIC | Age: 33
End: 2023-10-12
Payer: OTHER GOVERNMENT

## 2023-10-12 ENCOUNTER — OFFICE VISIT (OUTPATIENT)
Dept: BEHAVIORAL HEALTH | Facility: CLINIC | Age: 33
End: 2023-10-12
Payer: OTHER GOVERNMENT

## 2023-10-12 DIAGNOSIS — F52.0 DECREASED SEXUAL DESIRE: ICD-10-CM

## 2023-10-12 DIAGNOSIS — F41.9 ANXIETY: Primary | ICD-10-CM

## 2023-10-12 PROCEDURE — 84403 ASSAY OF TOTAL TESTOSTERONE: CPT

## 2023-10-12 PROCEDURE — 90834 PSYTX W PT 45 MINUTES: CPT | Performed by: SOCIAL WORKER

## 2023-10-12 PROCEDURE — 36415 COLL VENOUS BLD VENIPUNCTURE: CPT

## 2023-10-12 ASSESSMENT — PATIENT HEALTH QUESTIONNAIRE - PHQ9
SUM OF ALL RESPONSES TO PHQ QUESTIONS 1-9: 2
10. IF YOU CHECKED OFF ANY PROBLEMS, HOW DIFFICULT HAVE THESE PROBLEMS MADE IT FOR YOU TO DO YOUR WORK, TAKE CARE OF THINGS AT HOME, OR GET ALONG WITH OTHER PEOPLE: NOT DIFFICULT AT ALL
SUM OF ALL RESPONSES TO PHQ QUESTIONS 1-9: 2

## 2023-10-12 NOTE — PROGRESS NOTES
[facility] Primary Care: Integrated Behavioral Health  October 12, 2023    Behavioral Health Clinician Progress Note    Patient Name: Luis Alberto Alanis           Service Type:  Individual      Service Location:   Face to Face in Clinic     Session Start Time: 11:15 a.m.  Session End Time: 12:05 p.m.      Session Length: 38 - 52      Attendees: Client     Service Modality:  In-person    Visit Activities (Refresh list every visit): NEW and [facility] [place] Only    Diagnostic Assessment Date: next visit  Treatment Plan Review Date: after DA  See Flowsheets for today's PHQ-9 and ANJALI-7 results  Previous PHQ-9:       10/5/2023    10:01 AM 10/9/2023    10:51 AM 10/12/2023    10:00 AM   PHQ-9 SCORE   PHQ-9 Total Score MyChart 0  2 (Minimal depression)   PHQ-9 Total Score 0 0 2     Previous ANJALI-7:       6/2/2023     2:30 PM 10/9/2023    10:51 AM 10/10/2023     4:40 PM   ANJALI-7 SCORE   Total Score   3 (minimal anxiety)   Total Score 8 2 3       TU LEVEL:      10/5/2023    10:12 AM   TU Score (Last Two)   TU Raw Score 30   Activation Score 56   TU Level 3       DATA  Extended Session (60+ minutes): No  Interactive Complexity: No  Crisis: No  [facility] Patient: No    Treatment Objective(s) Addressed in This Session:  Target Behavior(s): disease management/lifestyle changes low libido, some mood issues, likely some anxiety    N/a    Current Stressors / Issues:  Pt  is referred by clinic triage nurse.  Pt has struggled with low libido sporadically for 7 months now, which has been quite distressing to him.  He goes on to talk about his dating life and the perceptions he has about being 33 and unmarried with no children.  Pt has sought help via psychotropic medications for very brief periods of time but found them helpful (about one week each). He has been taking a regimen of Viibryd for some time now and finds it beneficial, though cognitive distortions likely are still impeding his libido and mood.      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    Progress on Treatment Objective(s) / Homework:  N/a initial visit    Also provided psychoeducation about behavioral health condition, symptoms, and treatment options    Assessments completed prior to visit:  PROMIS 10-Global Health (all questions and answers displayed):       10/12/2023    10:13 AM   PROMIS 10   In general, would you say your health is: Excellent   In general, would you say your quality of life is: Very good   In general, how would you rate your physical health? Excellent   In general, how would you rate your mental health, including your mood and your ability to think? Good   In general, how would you rate your satisfaction with your social activities and relationships? Good   In general, please rate how well you carry out your usual social activities and roles Very good   To what extent are you able to carry out your everyday physical activities such as walking, climbing stairs, carrying groceries, or moving a chair? Completely   In the past 7 days, how often have you been bothered by emotional problems such as feeling anxious, depressed, or irritable? Sometimes   In the past 7 days, how would you rate your fatigue on average? None   In the past 7 days, how would you rate your pain on average, where 0 means no pain, and 10 means worst imaginable pain? 0   In general, would you say your health is: 5   In general, would you say your quality of life is: 4   In general, how would you rate your physical health? 5   In general, how would you rate your mental health, including your mood and your ability to think? 3   In general, how would you rate your satisfaction with your social activities and relationships? 3   In general, please rate how well you carry out your usual social activities and roles. (This includes activities at home, at work and in your community, and responsibilities as a parent, child, spouse, employee, friend, etc.) 4   To what extent are you able  to carry out your everyday physical activities such as walking, climbing stairs, carrying groceries, or moving a chair? 5   In the past 7 days, how often have you been bothered by emotional problems such as feeling anxious, depressed, or irritable? 3   In the past 7 days, how would you rate your fatigue on average? 1   In the past 7 days, how would you rate your pain on average, where 0 means no pain, and 10 means worst imaginable pain? 0   Global Mental Health Score 13   Global Physical Health Score 20   PROMIS TOTAL - SUBSCORES 33       Care Plan review completed: Yes    Medication Review:  No changes to current psychiatric medication(s)    Medication Compliance:  Yes    Changes in Health Issues:   None reported    Chemical Use Review:   Substance Use: Chemical use reviewed, no active concerns identified      Tobacco Use: No current tobacco use.      Assessment: Current Emotional / Mental Status (status of significant symptoms):  Risk status (Self / Other harm or suicidal ideation)  Patient denies a history of suicidal ideation, suicide attempts, self-injurious behavior, homicidal ideation, homicidal behavior, and and other safety concerns  Patient denies current fears or concerns for personal safety.  Patient denies current or recent suicidal ideation or behaviors.  Patient denies current or recent homicidal ideation or behaviors.  Patient denies current or recent self injurious behavior or ideation.  Patient denies other safety concerns.  A safety and risk management plan has not been developed at this time, however patient was encouraged to call [facility] should there be a change in any of these risk factors.    Appearance:   Appropriate   Eye Contact:   Good   Psychomotor Behavior: Normal   Attitude:   Interested Pleasant  Orientation:   All  Speech   Rate / Production: Normal/ Responsive Talkative   Volume:  Normal   Mood:    Anxious   Affect:    Appropriate   Thought Content:  Clear   Thought  Form:  Coherent  Logical  Circumstantial  Insight:    Intellectual Insight    Diagnoses:  1. Anxiety      Collateral Reports Completed:  Not Applicable    Plan: (Homework, other):  Patient was given information about behavioral services and encouraged to schedule a follow up appointment with the clinic [facility] in 2 weeks to compete DA.   He was also given information about mental health symptoms and treatment options .  CD Recommendations: No indications of CD issues.       MCKAYLA WesleySW

## 2023-10-14 LAB — TESTOST SERPL-MCNC: 433 NG/DL (ref 240–950)

## 2023-11-02 ENCOUNTER — OFFICE VISIT (OUTPATIENT)
Dept: BEHAVIORAL HEALTH | Facility: CLINIC | Age: 33
End: 2023-11-02
Payer: OTHER GOVERNMENT

## 2023-11-02 DIAGNOSIS — F43.23 ADJUSTMENT DISORDER WITH MIXED ANXIETY AND DEPRESSED MOOD: Primary | ICD-10-CM

## 2023-11-02 PROCEDURE — 90791 PSYCH DIAGNOSTIC EVALUATION: CPT | Performed by: SOCIAL WORKER

## 2023-11-02 ASSESSMENT — COLUMBIA-SUICIDE SEVERITY RATING SCALE - C-SSRS
2. HAVE YOU ACTUALLY HAD ANY THOUGHTS OF KILLING YOURSELF?: NO
ATTEMPT LIFETIME: NO
1. HAVE YOU WISHED YOU WERE DEAD OR WISHED YOU COULD GO TO SLEEP AND NOT WAKE UP?: NO
6. HAVE YOU EVER DONE ANYTHING, STARTED TO DO ANYTHING, OR PREPARED TO DO ANYTHING TO END YOUR LIFE?: NO
TOTAL  NUMBER OF ABORTED OR SELF INTERRUPTED ATTEMPTS LIFETIME: NO
TOTAL  NUMBER OF INTERRUPTED ATTEMPTS LIFETIME: NO

## 2023-11-02 NOTE — PROGRESS NOTES
"[facility]: Integrated Behavioral Health      PATIENT'S NAME: Luis Alberto Alanis  PREFERRED NAME: Luis Alberto  PRONOUNS:  he/him  MRN: [id number]  : 1990  ADDRESS: [address] [place] [id number]  ACCT. NUMBER:  [id number]  DATE OF SERVICE: 23  START TIME: 8:30 a.m.  END TIME: 9:30 a.m.  PREFERRED PHONE: [phone]  May we leave a program related message: Yes  SERVICE MODALITY:  In-person    UNIVERSAL ADULT Mental Health DIAGNOSTIC ASSESSMENT    Identifying Information:  Patient is a 33 year old  individual.  Patient was referred for an assessment by  triage nurse; primary care clinic.  Patient attended the session alone.    Chief Complaint:   The reason for seeking services at this time is: \"Depression\".    Pt  is referred by clinic triage nurse.  Pt has struggled with low libido sporadically for 7 months now, which has been quite distressing to him.  He goes on to talk about his dating life and the perceptions he has about being 33 and unmarried with no children.  Pt has sought help via psychotropic medications for very brief periods of time but found them helpful (about one week each). He has been taking a regimen of Viibryd for some time now and finds it beneficial, though cognitive distortions likely are still impeding his libido and mood.   The problem(s) began 23.    Social/Family History:  Patient reported they grew up in  [place], [place].  They were raised by biological mother.  Parents  / - they  when pt was age 1; he has never met his biological father.  Mom had a boyfriend for majority of pt's life and he passed away 7 years ago.   Patient reported that their childhood was \"wonderful.\"  Pt describes himself as good in academics, involved in athletics- football and baseball and was popular.  Patient described their current relationships with family of origin as good with mom, who lives in " [place].    The patient describes their cultural background as .  Cultural influences and impact on patient's life structure, values, norms, and healthcare: [facility].     These factors will be addressed in the Preliminary Treatment plan. Patient identified their preferred language to be English. Patient reported they does not need the assistance of an  or other support involved in therapy.     Patient reported had no significant delays in developmental tasks.   Patient's highest education level was college graduate.  Patient identified the following learning problems: none reported.  Modifications will not be used to assist communication in therapy. Patient reports they are  able to understand written materials.    Patient reported the following relationship history: Pt has been involved in some adult romantic relationships.  He had a girlfriend as a teenager until age 17.  After that he had some relationships that lasted less than a year.  Patient's current relationship status is has a partner or significant other for 6 months.   Patient identified their sexual orientation as heterosexual.  Patient reported having no child(min). Patient identified partner; mother; friends as part of their support system.  Patient identified the quality of these relationships as good.      Patient's current living/housing situation involves staying in own home/apartment.  The immediate members of family  include Lois Alanis, 70,Mother and they report that housing is stable.    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 Patient reports their finances are obtained through employment. Patient does not identify finances as a current stressor.      Patient reported that they have not been involved with the legal system. Patient does not report being under probation/ parole/ jurisdiction. They are not under any current court jurisdiction. .    Patient's Strengths and Limitations:  Patient  identified the following strengths or resources that will help them succeed in treatment: commitment to health and well being, exercise routine, friends / good social support, insight, intelligence, positive work environment, motivation, sense of humor, strong social skills, and work ethic. Things that may interfere with the patient's success in treatment include: lack of family support.     Assessments:  The following assessments were completed by patient for this visit:  PHQ9:       6/2/2023     2:30 PM 10/5/2023    10:01 AM 10/9/2023    10:51 AM 10/12/2023    10:00 AM   PHQ-9 SCORE   PHQ-9 Total Score MyChart  0  2 (Minimal depression)   PHQ-9 Total Score 9 0 0 2     GAD7:       6/2/2023     2:30 PM 10/9/2023    10:51 AM 10/10/2023     4:40 PM   ANJALI-7 SCORE   Total Score   3 (minimal anxiety)   Total Score 8 2 3     CAGE-AID:       10/12/2023    10:14 AM   CAGE-AID Total Score   Total Score 0   Total Score MyChart 0 (A total score of 2 or greater is considered clinically significant)     PROMIS 10-Global Health (all questions and answers displayed):       10/12/2023    10:13 AM   PROMIS 10   In general, would you say your health is: Excellent   In general, would you say your quality of life is: Very good   In general, how would you rate your physical health? Excellent   In general, how would you rate your mental health, including your mood and your ability to think? Good   In general, how would you rate your satisfaction with your social activities and relationships? Good   In general, please rate how well you carry out your usual social activities and roles Very good   To what extent are you able to carry out your everyday physical activities such as walking, climbing stairs, carrying groceries, or moving a chair? Completely   In the past 7 days, how often have you been bothered by emotional problems such as feeling anxious, depressed, or irritable? Sometimes   In the past 7 days, how would you rate your fatigue  on average? None   In the past 7 days, how would you rate your pain on average, where 0 means no pain, and 10 means worst imaginable pain? 0   In general, would you say your health is: 5   In general, would you say your quality of life is: 4   In general, how would you rate your physical health? 5   In general, how would you rate your mental health, including your mood and your ability to think? 3   In general, how would you rate your satisfaction with your social activities and relationships? 3   In general, please rate how well you carry out your usual social activities and roles. (This includes activities at home, at work and in your community, and responsibilities as a parent, child, spouse, employee, friend, etc.) 4   To what extent are you able to carry out your everyday physical activities such as walking, climbing stairs, carrying groceries, or moving a chair? 5   In the past 7 days, how often have you been bothered by emotional problems such as feeling anxious, depressed, or irritable? 3   In the past 7 days, how would you rate your fatigue on average? 1   In the past 7 days, how would you rate your pain on average, where 0 means no pain, and 10 means worst imaginable pain? 0   Global Mental Health Score 13   Global Physical Health Score 20   PROMIS TOTAL - SUBSCORES 33     [employer] Suicide Severity Rating Scale (Lifetime/Recent)      11/2/2023     8:49 AM   [employer] Suicide Severity Rating (Lifetime/Recent)   Q1 Wish to be Dead (Lifetime) N   Q2 Non-Specific Active Suicidal Thoughts (Lifetime) N   Actual Attempt (Lifetime) N   Has subject engaged in non-suicidal self-injurious behavior? (Lifetime) N   Interrupted Attempts (Lifetime) N   Aborted or Self-Interrupted Attempt (Lifetime) N   Preparatory Acts or Behavior (Lifetime) N   Calculated C-SSRS Risk Score (Lifetime/Recent) No Risk Indicated       Personal and Family Medical History:  Patient does not report a family history of mental health concerns.   Patient reports family history includes Asthma in his maternal grandfather; Diverticulitis in his maternal grandmother and mother; Glaucoma in his maternal aunt, maternal grandmother, and maternal uncle..     Patient does not report Mental Health Diagnosis or Treatment.      Patient has had a physical exam to rule out medical causes for current symptoms.  Date of last physical exam was within the past year. Client was encouraged to follow up with PCP if symptoms were to develop. The patient has a [facility] Provider, who is named Arron Arzate..  Patient reports no current medical concerns.  Patient denies any issues with pain..   There are not significant appetite / nutritional concerns / weight changes.   Patient does not report a history of head injury / trauma / cognitive impairment.      Patient reports current meds as:   Outpatient Medications Marked as Taking for the 11/2/23 encounter (Office Visit) with Ade Villaseñor LICSW   Medication Sig    vilazodone (VIIBRYD) 20 MG TABS tablet Take 20 mg by mouth daily with food       Medication Adherence:  Patient reports taking.    Patient Allergies:  No Known Allergies    Medical History:    Past Medical History:   Diagnosis Date    Acute renal insufficiency 12/7/2021    Hematuria 12/7/2021    Leukopenia 12/7/2021    Rhabdomyolysis 12/7/2021         Current Mental Status Exam:   Appearance:  Appropriate    Eye Contact:  Good   Psychomotor:  Normal       Gait / station:  no problem  Attitude / Demeanor: Interested Pleasant  Speech      Rate / Production: Normal/ Responsive      Volume:  Normal  volume      Language:  intact  Mood:   Normal  Affect:   Appropriate    Thought Content: Clear   Thought Process: Coherent  Logical       Associations: No loosening of associations  Insight:   Good   Judgment:  Intact   Orientation:  All  Attention/concentration: Good    Substance Use:  Patient did report a family history of substance use concerns; see medical  history section for details.  Patient has not received chemical dependency treatment in the past.  Patient has not ever been to detox.      Patient is not currently receiving any chemical dependency treatment.           Substance History of use Age of first use Date of last use     Pattern and duration of use (include amounts and frequency)   Alcohol currently use   26 08/26/23 In social situations, drinks one drink at each place   Cannabis   never used     REPORTS SUBSTANCE USE: N/A     Amphetamines   never used     REPORTS SUBSTANCE USE: N/A   Cocaine/crack    never used       REPORTS SUBSTANCE USE: N/A   Hallucinogens never used         REPORTS SUBSTANCE USE: N/A   Inhalants never used         REPORTS SUBSTANCE USE: N/A   Heroin never used         REPORTS SUBSTANCE USE: N/A   Other Opiates never used     REPORTS SUBSTANCE USE: N/A   Benzodiazepine   never used     REPORTS SUBSTANCE USE: N/A   Barbiturates never used     REPORTS SUBSTANCE USE: N/A   Over the counter meds never used     REPORTS SUBSTANCE USE: N/A   Caffeine never used     Drinks 2 cups of coffee daily   Nicotine  never used     REPORTS SUBSTANCE USE: N/A   Other substances not listed above:  Identify:  never used     REPORTS SUBSTANCE USE: N/A     Patient reported the following problems as a result of their substance use: no problems, not applicable.    Substance Use: No symptoms    Based on the negative CAGE score and clinical interview there  are not indications of drug or alcohol abuse.    Significant Losses / Trauma / Abuse / Neglect Issues:   Patient did not serve in the .  There are indications or report of significant loss, trauma, abuse or neglect issues related to: are no indications and client denies any losses, trauma, abuse, or neglect concerns.  Concerns for possible neglect are not present.     Safety Assessment:   Patient denies current homicidal ideation and behaviors.  Patient denies current self-injurious ideation and  behaviors.    Patient denied risk behaviors associated with substance use.  Patient denies any high risk behaviors associated with mental health symptoms.  Patient reports the following current concerns for their personal safety: None.  Patient reports there are firearms in the home not locked up but nobody living there but him    History of Safety Concerns:  Patient denied a history of homicidal ideation.     Patient denied a history of personal safety concerns.    Patient denied a history of assaultive behaviors.    Patient denied a history of sexual assault behaviors.     Patient denied a history of risk behaviors associated with substance use.  Patient denies any history of high risk behaviors associated with mental health symptoms.  Patient reports the following protective factors: forward or future oriented thinking; dedication to family or friends; safe and stable environment; regular sleep; effectively controls impulses; regular physical activity; purpose; daily obligations; structured day; effective problem solving skills; commitment to well being; sense of meaning; positive social skills; healthy fear of risky behaviors or pain; financial stability; strong sense of self worth or esteem; sense of personal control or determination    Risk Plan:  See Recommendations for Safety and Risk Management Plan    Review of Symptoms per patient report:   Depression: Lack of interest and feeling down  Marly:  No Symptoms  Psychosis: No Symptoms  Anxiety: Nervousness and Irritability  Panic:  No symptoms  Post Traumatic Stress Disorder:  No Symptoms   Eating Disorder: No Symptoms  ADD / ADHD:  No symptoms  Conduct Disorder: No symptoms  Autism Spectrum Disorder: No symptoms  Obsessive Compulsive Disorder: No Symptoms    Patient reports the following compulsive behaviors and treatment history:  none .      Diagnostic Criteria:   Adjustment Disorder  A. The development of emotional or behavioral symptoms in response to an  identifiable stressor(s) occurring within 3 months of the onset of the stressor(s)  B. These symptoms or behaviors are clinically significant, as evidenced by one or both of the following:       - Marked distress that is out of proportion to the severity/intensity of the stressor (with consideration for external context & culture)  C. The stress-related disturbance does not meet criteria for another disorder & is not not an exacerbation of another mental disorder  D. The symptoms do not represent normal bereavement  E. Once the stressor or its consequences have terminated, the symptoms do not persist for more than an additional 6 months       * Adjustment Disorder with Anxiety: The predominant manfestations are symptoms such as nervousness, worry, or jitteriness, or, in children separation anxiety from major attachment figures    Functional Status:  Patient reports the following functional impairments:  relationship(s) and self-care.     Nonprogrammatic care:  Patient is requesting basic services to address current mental health concerns.    Clinical Summary:  1. Reason for assessment: low libido  2. Psychosocial, Cultural and Contextual Factors: born and raised in [facility]  .  3. Principal DSM5 Diagnoses  (Sustained by DSM5 Criteria Listed Above):   Adjustment Disorders  309.28 (F43.23) With mixed anxiety and depressed mood.  4. Other Diagnoses that is relevant to services:   n/a  5. Provisional Diagnosis:  n/a  6. Prognosis: Expect Improvement and Relieve Acute Symptoms.  7. Likely consequences of symptoms if not treated: Left untreated, patient will likely not see an improvement in her symptoms and may experiencing worsening of symptoms to the degree that functioning is further impaired. Short term care episode or ongoing outpatient mental health services will mitigate risk for symptom increase.    8. Client strengths include:  caring, educated, employed, has a previous history of therapy, insightful,  intelligent, motivated, open to learning, open to suggestions / feedback, and support of family, friends and providers .     Recommendations:     1. Plan for Safety and Risk Management:   Safety and Risk: Recommended that patient call 911 or go to the local ED should there be a change in any of these risk factors..          Report to child / adult protection services was NA.     2. Patient's identified  n/a .     3. Initial Treatment will focus on:    Adjustment Difficulties related to: anxiety/medical .     4. Resources/Service Plan:    services are not indicated.   Modifications to assist communication are not indicated.   Additional disability accommodations are not indicated.      5. Collaboration:   Collaboration / coordination of treatment will be initiated with the following  support professionals: primary care physician.      6.  Referrals:   The following referral(s) will be initiated:  none .      A Release of Information has been obtained for the following:  n/a .     Clinical Substantiation/medical necessity for the above recommendations:   Recommendations based upon review of symptoms and current level of functioning per patient report.   .    7. MARQUISE:    MARQUISE:  Discussed the general effects of drugs and alcohol on health and well-being. Provider gave patient printed information about the  effects of chemical use on their health and well being. Recommendations: none    8. Records:   These were not available for review at time of assessment.   Information in this assessment was obtained from the medical record and  provided by patient who is a good historian.    Patient will have open access to their mental health medical record.    9.   Interactive Complexity: No    10. Safety Plan:  Patient denied any current/recent/lifetime history of suicidal ideation and/or behaviors.  No safety plan indicated at this time.     Provider Name/ Credentials:  KIZZY Pritchett, [facility]  Behavioral Health  Clinician  November 2, 2023

## 2023-11-25 ENCOUNTER — APPOINTMENT (OUTPATIENT)
Dept: GENERAL RADIOLOGY | Facility: CLINIC | Age: 33
End: 2023-11-25
Attending: EMERGENCY MEDICINE
Payer: COMMERCIAL

## 2023-11-25 ENCOUNTER — HOSPITAL ENCOUNTER (EMERGENCY)
Facility: CLINIC | Age: 33
Discharge: HOME | End: 2023-11-25
Attending: EMERGENCY MEDICINE | Admitting: EMERGENCY MEDICINE
Payer: COMMERCIAL

## 2023-11-25 VITALS
SYSTOLIC BLOOD PRESSURE: 129 MMHG | OXYGEN SATURATION: 99 % | DIASTOLIC BLOOD PRESSURE: 82 MMHG | TEMPERATURE: 97.5 F | RESPIRATION RATE: 18 BRPM | HEART RATE: 62 BPM

## 2023-11-25 DIAGNOSIS — S16.1XXA STRAIN OF NECK MUSCLE, INITIAL ENCOUNTER: ICD-10-CM
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
## 2023-11-25 DIAGNOSIS — M54.6 ACUTE RIGHT-SIDED THORACIC BACK PAIN: ICD-10-CM

## 2023-11-25 PROCEDURE — 72072 X-RAY EXAM THORAC SPINE 3VWS: CPT

## 2023-11-25 PROCEDURE — 250N000013 HC RX MED GY IP 250 OP 250 PS 637: Performed by: EMERGENCY MEDICINE

## 2023-11-25 PROCEDURE — 99284 EMERGENCY DEPT VISIT MOD MDM: CPT

## 2023-11-25 PROCEDURE — 72040 X-RAY EXAM NECK SPINE 2-3 VW: CPT

## 2023-11-25 RX ORDER — CYCLOBENZAPRINE HCL 10 MG
10 TABLET ORAL 3 TIMES DAILY PRN
Qty: 12 TABLET | Refills: 0 | Status: SHIPPED | OUTPATIENT
Start: 2023-11-25 | End: 2024-06-14

## 2023-11-25 RX ORDER — IBUPROFEN 600 MG/1
600 TABLET, FILM COATED ORAL ONCE
Status: COMPLETED | OUTPATIENT
Start: 2023-11-25 | End: 2023-11-25

## 2023-11-25 RX ORDER — LIDOCAINE 4 G/G
1 PATCH TOPICAL ONCE
Status: DISCONTINUED | OUTPATIENT
Start: 2023-11-25 | End: 2023-11-25 | Stop reason: HOSPADM

## 2023-11-25 RX ORDER — LIDOCAINE 50 MG/G
1 PATCH TOPICAL EVERY 24 HOURS
Qty: 7 PATCH | Refills: 0 | Status: SHIPPED | OUTPATIENT
Start: 2023-11-25 | End: 2023-12-02

## 2023-11-25 RX ADMIN — IBUPROFEN 600 MG: 600 TABLET ORAL at 09:38

## 2023-11-25 RX ADMIN — LIDOCAINE 1 PATCH: 4 PATCH TOPICAL at 09:38

## 2023-11-25 NOTE — DISCHARGE INSTRUCTIONS
Return to ER immediately if you develop: worsening pain, new vision loss, new numbness or weakness in your arms or legs, loss of control of bladder or bowels, Fever > 101, persistent nausea or vomiting OR you have any other concerns about your health.
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
## 2024-04-10 ENCOUNTER — TRANSFERRED RECORDS (OUTPATIENT)
Dept: HEALTH INFORMATION MANAGEMENT | Facility: CLINIC | Age: 34
End: 2024-04-10
Payer: OTHER GOVERNMENT

## 2024-04-19 ENCOUNTER — TRANSFERRED RECORDS (OUTPATIENT)
Dept: HEALTH INFORMATION MANAGEMENT | Facility: CLINIC | Age: 34
End: 2024-04-19
Payer: OTHER GOVERNMENT

## 2024-05-06 ENCOUNTER — TRANSFERRED RECORDS (OUTPATIENT)
Dept: HEALTH INFORMATION MANAGEMENT | Facility: CLINIC | Age: 34
End: 2024-05-06
Payer: OTHER GOVERNMENT

## 2024-06-07 ENCOUNTER — E-VISIT (OUTPATIENT)
Dept: FAMILY MEDICINE | Facility: CLINIC | Age: 34
End: 2024-06-07
Payer: OTHER GOVERNMENT

## 2024-06-07 ENCOUNTER — NURSE TRIAGE (OUTPATIENT)
Dept: FAMILY MEDICINE | Facility: CLINIC | Age: 34
End: 2024-06-07

## 2024-06-07 DIAGNOSIS — J06.9 VIRAL URI WITH COUGH: Primary | ICD-10-CM

## 2024-06-07 DIAGNOSIS — J30.2 SEASONAL ALLERGIC RHINITIS, UNSPECIFIED TRIGGER: ICD-10-CM

## 2024-06-07 PROCEDURE — 99421 OL DIG E/M SVC 5-10 MIN: CPT | Performed by: FAMILY MEDICINE

## 2024-06-07 NOTE — TELEPHONE ENCOUNTER
Patient calls to ask about getting additional medication for allergies. States he has used prednisone in the past. Advised patient he would need an appointment. He is declining urgent care but willing to submit e-visit at this time.    Lissett Gabriel RN on 6/7/2024 at 4:46 PM    Reason for Disposition   Nasal discharge present > 10 days    Additional Information   Negative: Wheezing (high pitched whistling sound) and previous asthma attacks or use of asthma medicines   Negative: Doesn't match the SYMPTOMS for nasal allergy   Negative: Patient sounds very sick or weak to the triager   Negative: Lots of coughing   Negative: Lots of yellow or green discharge from nose, present > 3 days    Answer Assessment - Initial Assessment Questions  1. SYMPTOM: runny nose, sneezing, itchy eyes   2. SEVERITY: Keeping patient awake at night  3. EYES: eyes are watery and itching   4. TRIGGER: Pollen  5. TREATMENT: Zyrtec and Sudafed   6. OTHER SYMPTOMS: Denies: wheezing. Reports trouble breathing through nose being plugged    Protocols used: Nasal Allergies (Hay Fever)-A-OH

## 2024-06-08 ENCOUNTER — TELEPHONE (OUTPATIENT)
Dept: FAMILY MEDICINE | Facility: CLINIC | Age: 34
End: 2024-06-08
Payer: OTHER GOVERNMENT

## 2024-06-08 RX ORDER — ALBUTEROL SULFATE 90 UG/1
2 AEROSOL, METERED RESPIRATORY (INHALATION) EVERY 4 HOURS PRN
Qty: 18 G | Refills: 1 | Status: SHIPPED | OUTPATIENT
Start: 2024-06-08

## 2024-06-08 NOTE — PATIENT INSTRUCTIONS
CRISTY Sahu,  Thank you for choosing us for your care. I have placed an order for a prescription so that you can start treatment. View your full visit summary for details by clicking on the link below. Your pharmacist will able to address any questions you may have about the medication.     If you're not feeling better within 5-7 days, please schedule an appointment.  You can schedule an appointment right here in [facility], or call [phone]  If the visit is for the same symptoms as your eVisit, we'll refund the cost of your eVisit if seen within seven days.  Bronchitis: Care Instructions  Overview     Bronchitis is inflammation of the bronchial tubes, which carry air to the lungs. The tubes swell and produce mucus, or phlegm. The mucus and inflamed bronchial tubes make you cough. You may have trouble breathing.  Most cases of bronchitis are caused by viruses like those that cause colds. Antibiotics most often do not help and they may cause harm.  Bronchitis usually develops rapidly and lasts about 2 to 3 weeks in people who are otherwise healthy.  Follow-up care is a key part of your treatment and safety. Be sure to make and go to all appointments, and call your doctor if you are having problems. It's also a good idea to know your test results and keep a list of the medicines you take.  How can you care for yourself at home?  Take all medicines exactly as prescribed. Call your doctor if you think you are having a problem with your medicine.  Get some extra rest.  Take an over-the-counter pain medicine, such as acetaminophen (Tylenol), ibuprofen (Advil, Motrin), or naproxen (Aleve) to reduce fever and relieve body aches. Read and follow all instructions on the label.  Do not take two or more pain medicines at the same time unless the doctor told you to. Many pain medicines have acetaminophen, which is Tylenol. Too much acetaminophen (Tylenol) can be harmful.  Take an over-the-counter cough medicine to help quiet a dry,  "hacking cough so that you can sleep. Avoid cough medicines that have more than one active ingredient. Read and follow all instructions on the label.  Do not smoke. Smoking can make bronchitis worse. If you need help quitting, talk to your doctor about stop-smoking programs and medicines. These can increase your chances of quitting for good.  When should you call for help?   Call 911 anytime you think you may need emergency care. For example, call if:    You have severe trouble breathing.   Call your doctor now or seek immediate medical care if:    You have new or worse trouble breathing.     You cough up dark brown or bloody mucus (sputum).     You have a new or higher fever.     You have a new rash.   Watch closely for changes in your health, and be sure to contact your doctor if:    You cough more deeply or more often, especially if you notice more mucus or a change in the color of your mucus.     You are not getting better as expected.   Where can you learn more?  Go to https://www.[employer].net/patiented  Enter H333 in the search box to learn more about \"Bronchitis: Care Instructions.\"  Current as of: August 6, 2023               Content Version: 14.0    6045-0315 [employer].   Care instructions adapted under license by your healthcare professional. If you have questions about a medical condition or this instruction, always ask your healthcare professional. [employer] disclaims any warranty or liability for your use of this information.          "

## 2024-06-08 NOTE — TELEPHONE ENCOUNTER
General Call      Reason for Call:  MEDICATION REQUES    What are your questions or concerns:  Patient had called stating he had an evisit done and would like medication for - WATERY EYES, EAR AND THROAT SCRATCHING.       Could we send this information to you in [employer] or would you prefer to receive a phone call?:   Patient would prefer a phone call   Okay to leave a detailed message?: Yes at Home number on file [phone] (home)

## 2024-06-10 RX ORDER — FEXOFENADINE HCL 180 MG/1
180 TABLET ORAL DAILY
Qty: 30 TABLET | Refills: 2 | Status: SHIPPED | OUTPATIENT
Start: 2024-06-10 | End: 2024-06-14

## 2024-06-10 NOTE — TELEPHONE ENCOUNTER
RN chart review   Evisit completed on 6/7/2024   Appears that Dr. Arzate sent a prescription for Fexofenadine today     RN placed call to patient, notified that fexofenadine was sent to   [place] [id number] [place] [facility] [id number] [address]     Patient will  medication, if further questions/concerns will return call to advise     Domi More, Registered Nurse  [facility]

## 2024-06-11 ENCOUNTER — NURSE TRIAGE (OUTPATIENT)
Dept: FAMILY MEDICINE | Facility: CLINIC | Age: 34
End: 2024-06-11
Payer: OTHER GOVERNMENT

## 2024-06-11 NOTE — TELEPHONE ENCOUNTER
S-(situation): RN called to triage symptoms from recent e-visit.     B-(background): Per e-visit: Good Morning Dr. Arzate, my apologies for the miscommunications. I have already been taking over the counter Zyrtec, Sudafed, Allegra, Nose Spray. They are no longer effective and I am requesting a prescription of Prednisone and antibiotics.    Arron Arzate MD   to Luis Alberto RYAN Zeke     6/11/24  7:23 AM  My nurse will call you today to discuss.  Arron Arzate MD    OV advised per A.A.     A-(assessment): Patient experiencing sinus congestion, no pain. Ears itchy. Eyes watery. Sneezing. Runny nose with clear discharge. Denies any pain, no fever, no difficulty breathing. Able to breathe through nose. No cough. Patient has tried zyrtec and neti pot, not helping. Patient notes in the past he was given augmentin or prednisone which helped. Symptoms started 10-12 days ago.     R-(recommendations): Per protocol, advised visit today or tomorrow. Patient states he cannot come in today or tomorrow, patient is requesting appointment for Friday. Scheduled for OV 6/14/24. No further questions at this time.     Reason for Disposition   Sinus congestion (pressure, fullness) present > 10 days    Additional Information   Negative: Sounds like a life-threatening emergency to the triager   Negative: Difficulty breathing, and not from stuffy nose (e.g., not relieved by cleaning out the nose)   Negative: SEVERE headache and has fever   Negative: Patient sounds very sick or weak to the triager   Negative: SEVERE sinus pain   Negative: SEVERE headache   Negative: Redness or swelling on the cheek, forehead, or around the eye   Negative: Fever > 103 F (39.4 C)   Negative: Fever > 101 F (38.3 C) and over 60 years of age   Negative: Fever > 100.0 F (37.8 C) and has diabetes mellitus or a weak immune system (e.g., HIV positive, cancer chemotherapy, organ transplant, splenectomy, chronic steroids)   Negative: Fever > 100.0 F (37.8 C) and bedridden (e.g., CVA,  chronic illness, recovering from surgery)   Negative: Fever present > 3 days (72 hours)   Negative: Fever returns after gone for over 24 hours and symptoms worse or not improved   Negative: Sinus pain (not just congestion) and fever   Negative: Earache    Protocols used: Sinus Pain or Congestion-SHRUTHI GUERIN RN 6/11/2024 at 8:32 AM     None known

## 2024-06-14 ENCOUNTER — OFFICE VISIT (OUTPATIENT)
Dept: FAMILY MEDICINE | Facility: CLINIC | Age: 34
End: 2024-06-14
Payer: OTHER GOVERNMENT

## 2024-06-14 VITALS
OXYGEN SATURATION: 100 % | HEART RATE: 94 BPM | DIASTOLIC BLOOD PRESSURE: 81 MMHG | HEIGHT: 69 IN | WEIGHT: 186.4 LBS | SYSTOLIC BLOOD PRESSURE: 122 MMHG | TEMPERATURE: 97.6 F | BODY MASS INDEX: 27.61 KG/M2 | RESPIRATION RATE: 16 BRPM

## 2024-06-14 DIAGNOSIS — J30.2 SEASONAL ALLERGIC RHINITIS, UNSPECIFIED TRIGGER: Primary | ICD-10-CM

## 2024-06-14 DIAGNOSIS — H65.191 ACUTE EFFUSION OF RIGHT EAR: ICD-10-CM

## 2024-06-14 PROCEDURE — 99213 OFFICE O/P EST LOW 20 MIN: CPT | Performed by: PHYSICIAN ASSISTANT

## 2024-06-14 RX ORDER — OLOPATADINE HYDROCHLORIDE 1 MG/ML
1 SOLUTION/ DROPS OPHTHALMIC 2 TIMES DAILY
Qty: 5 ML | Refills: 1 | Status: SHIPPED | OUTPATIENT
Start: 2024-06-14

## 2024-06-14 RX ORDER — FLUTICASONE PROPIONATE 50 MCG
1 SPRAY, SUSPENSION (ML) NASAL DAILY
Qty: 16 G | Refills: 1 | Status: SHIPPED | OUTPATIENT
Start: 2024-06-14

## 2024-06-14 RX ORDER — AZELASTINE 1 MG/ML
1 SPRAY, METERED NASAL 2 TIMES DAILY
Qty: 30 ML | Refills: 1 | Status: SHIPPED | OUTPATIENT
Start: 2024-06-14

## 2024-06-14 RX ORDER — CETIRIZINE HYDROCHLORIDE 10 MG/1
10 TABLET ORAL DAILY
COMMUNITY

## 2024-06-14 RX ORDER — LEVOCETIRIZINE DIHYDROCHLORIDE 5 MG/1
5 TABLET, FILM COATED ORAL EVERY EVENING
Qty: 30 TABLET | Refills: 5 | Status: SHIPPED | OUTPATIENT
Start: 2024-06-14

## 2024-06-14 RX ORDER — PSEUDOEPHEDRINE HCL 30 MG
TABLET ORAL EVERY 4 HOURS PRN
COMMUNITY

## 2024-06-14 RX ORDER — PREDNISONE 20 MG/1
40 TABLET ORAL DAILY
Qty: 10 TABLET | Refills: 0 | Status: SHIPPED | OUTPATIENT
Start: 2024-06-14 | End: 2024-06-19

## 2024-06-14 ASSESSMENT — PATIENT HEALTH QUESTIONNAIRE - PHQ9
10. IF YOU CHECKED OFF ANY PROBLEMS, HOW DIFFICULT HAVE THESE PROBLEMS MADE IT FOR YOU TO DO YOUR WORK, TAKE CARE OF THINGS AT HOME, OR GET ALONG WITH OTHER PEOPLE: NOT DIFFICULT AT ALL
SUM OF ALL RESPONSES TO PHQ QUESTIONS 1-9: 0
SUM OF ALL RESPONSES TO PHQ QUESTIONS 1-9: 0

## 2024-06-14 ASSESSMENT — ANXIETY QUESTIONNAIRES
5. BEING SO RESTLESS THAT IT IS HARD TO SIT STILL: NOT AT ALL
8. IF YOU CHECKED OFF ANY PROBLEMS, HOW DIFFICULT HAVE THESE MADE IT FOR YOU TO DO YOUR WORK, TAKE CARE OF THINGS AT HOME, OR GET ALONG WITH OTHER PEOPLE?: NOT DIFFICULT AT ALL
6. BECOMING EASILY ANNOYED OR IRRITABLE: NOT AT ALL
GAD7 TOTAL SCORE: 0
2. NOT BEING ABLE TO STOP OR CONTROL WORRYING: NOT AT ALL
GAD7 TOTAL SCORE: 0
4. TROUBLE RELAXING: NOT AT ALL
GAD7 TOTAL SCORE: 0
1. FEELING NERVOUS, ANXIOUS, OR ON EDGE: NOT AT ALL
3. WORRYING TOO MUCH ABOUT DIFFERENT THINGS: NOT AT ALL
IF YOU CHECKED OFF ANY PROBLEMS ON THIS QUESTIONNAIRE, HOW DIFFICULT HAVE THESE PROBLEMS MADE IT FOR YOU TO DO YOUR WORK, TAKE CARE OF THINGS AT HOME, OR GET ALONG WITH OTHER PEOPLE: NOT DIFFICULT AT ALL
7. FEELING AFRAID AS IF SOMETHING AWFUL MIGHT HAPPEN: NOT AT ALL
7. FEELING AFRAID AS IF SOMETHING AWFUL MIGHT HAPPEN: NOT AT ALL

## 2024-06-14 ASSESSMENT — PAIN SCALES - GENERAL: PAINLEVEL: NO PAIN (0)

## 2024-06-14 NOTE — PROGRESS NOTES
Assessment & Plan     Seasonal allergic rhinitis, unspecified trigger  Switch from Zyrtec to Xyzal. Added Astelin to the Flonase and recommended he start taking daily. Also provided olopatadine to help with eye watering.  Prednisone has been given in the past to help with allergies (prescribed by ENT). He would like to try this again. I think it is reasonable to help with the ear symptoms he has been having (effusion).  - predniSONE (DELTASONE) 20 MG tablet; Take 2 tablets (40 mg) by mouth daily for 5 days  - levocetirizine (XYZAL) 5 MG tablet; Take 1 tablet (5 mg) by mouth every evening  - olopatadine (PATANOL) 0.1 % ophthalmic solution; Place 1 drop into both eyes 2 times daily  - azelastine (ASTELIN) 0.1 % nasal spray; Spray 1 spray into both nostrils 2 times daily  - fluticasone (FLONASE) 50 MCG/ACT nasal spray; Spray 1 spray into both nostrils daily    Acute effusion of right ear    - predniSONE (DELTASONE) 20 MG tablet; Take 2 tablets (40 mg) by mouth daily for 5 days      Enzo Sahu is a 34 year old, presenting for the following health issues:  Sinus Problem      6/14/2024     1:58 PM   Additional Questions   Roomed by edward marshall   Accompanied by self     Sinus Problem     History of Present Illness       Reason for visit:  Sinus infection, seasonal allergies  Symptom onset:  1-2 weeks ago  Symptoms include:  Ears itching,eyes watery,sneezing,scratchy throat  Symptom intensity:  Moderate  Symptom progression:  Staying the same  Had these symptoms before:  Yes  Has tried/received treatment for these symptoms:  Yes  Previous treatment was successful:  Yes  Prior treatment description:  Zyrtec, sudafed  What makes it worse:  No  What makes it better:  Antihystamine    He eats 2-3 servings of fruits and vegetables daily.He consumes 0 sweetened beverage(s) daily.He exercises with enough effort to increase his heart rate 20 to 29 minutes per day.  He exercises with enough effort to increase his heart rate 4  "days per week.   He is taking medications regularly.       Acute Illness  Acute illness concerns: sinus problems  Onset/Duration: year round sinus issues but last 2 weeks it has been worse  Symptoms:  Fever: No  Chills/Sweats: No  Headache (location?): No  Sinus Pressure: No  Conjunctivitis:  No  Ear Pain: no  Rhinorrhea: No  Congestion: No  Sore Throat: No  Cough: no  Wheeze: No  Decreased Appetite: No  Nausea: No  Vomiting: No  Diarrhea: No  Dysuria/Freq.: No  Dysuria or Hematuria: No  Fatigue/Achiness: No  Sick/Strep Exposure: No  Therapies tried and outcome: None    Reported of having year round sinus issues but the past 2 weeks it has been worse. He normally takes allergy meds (Zyrtec) and that usually helps it but this time around it hasn't.   He has previously been on Allegra and Claritin in the past but they did not help.  He has used nasal sprays (Afrin, Flonase) and NetiPot in the past.    He reports watery eyes, nasal drainage and ear symptoms. He has sneezing as well.    He does have history of asthma.        Objective    /81 (BP Location: Left arm, Patient Position: Chair, Cuff Size: Adult Regular)   Pulse 94   Temp 97.6  F (36.4  C) (Temporal)   Resp 16   Ht 1.753 m (5' 9\")   Wt 84.6 kg (186 lb 6.4 oz)   SpO2 100%   BMI 27.53 kg/m    Body mass index is 27.53 kg/m .  Physical Exam   GENERAL: No acute distress  HEENT: Normocephalic, PERRL, Canals patent, bilateral TM's non-erythematous and non-bulging. Turbinates normal in appearance bilaterally. Posterior oropharynx non-erythematous and without exudate.  NECK: No cervical or supraclavicular lymphadenopathy.  CARDIAC: Regular rate and rhythm. No murmurs.  PULMONARY: Lungs are clear to auscultation bilaterally. No wheezes, rhonchi or crackles.  NEURO: Alert and non-focal          Signed Electronically by: Ileana Delarosa PA-C    "

## 2024-09-05 ENCOUNTER — PATIENT OUTREACH (OUTPATIENT)
Dept: CARE COORDINATION | Facility: CLINIC | Age: 34
End: 2024-09-05
Payer: OTHER GOVERNMENT

## 2024-09-19 ENCOUNTER — PATIENT OUTREACH (OUTPATIENT)
Dept: CARE COORDINATION | Facility: CLINIC | Age: 34
End: 2024-09-19
Payer: OTHER GOVERNMENT

## 2024-11-30 SDOH — HEALTH STABILITY: PHYSICAL HEALTH: ON AVERAGE, HOW MANY DAYS PER WEEK DO YOU ENGAGE IN MODERATE TO STRENUOUS EXERCISE (LIKE A BRISK WALK)?: 4 DAYS

## 2024-11-30 SDOH — HEALTH STABILITY: PHYSICAL HEALTH: ON AVERAGE, HOW MANY MINUTES DO YOU ENGAGE IN EXERCISE AT THIS LEVEL?: 100 MIN

## 2024-11-30 ASSESSMENT — SOCIAL DETERMINANTS OF HEALTH (SDOH): HOW OFTEN DO YOU GET TOGETHER WITH FRIENDS OR RELATIVES?: ONCE A WEEK

## 2024-12-05 ENCOUNTER — OFFICE VISIT (OUTPATIENT)
Dept: FAMILY MEDICINE | Facility: CLINIC | Age: 34
End: 2024-12-05
Payer: OTHER GOVERNMENT

## 2024-12-05 VITALS
HEIGHT: 69 IN | OXYGEN SATURATION: 98 % | DIASTOLIC BLOOD PRESSURE: 78 MMHG | BODY MASS INDEX: 29.33 KG/M2 | HEART RATE: 81 BPM | TEMPERATURE: 98.5 F | SYSTOLIC BLOOD PRESSURE: 122 MMHG | RESPIRATION RATE: 14 BRPM | WEIGHT: 198 LBS

## 2024-12-05 DIAGNOSIS — F33.0 MILD EPISODE OF RECURRENT MAJOR DEPRESSIVE DISORDER (H): ICD-10-CM

## 2024-12-05 DIAGNOSIS — Z00.00 ROUTINE GENERAL MEDICAL EXAMINATION AT A HEALTH CARE FACILITY: Primary | ICD-10-CM

## 2024-12-05 DIAGNOSIS — J30.2 SEASONAL ALLERGIC RHINITIS, UNSPECIFIED TRIGGER: ICD-10-CM

## 2024-12-05 LAB
CREAT SERPL-MCNC: 1.27 MG/DL (ref 0.67–1.17)
CREAT UR-MCNC: 261 MG/DL
EGFRCR SERPLBLD CKD-EPI 2021: 75 ML/MIN/1.73M2
MICROALBUMIN UR-MCNC: <12 MG/L
MICROALBUMIN/CREAT UR: NORMAL MG/G{CREAT}

## 2024-12-05 RX ORDER — OLOPATADINE HYDROCHLORIDE 1 MG/ML
1 SOLUTION/ DROPS OPHTHALMIC 2 TIMES DAILY
Qty: 5 ML | Refills: 1 | Status: SHIPPED | OUTPATIENT
Start: 2024-12-05

## 2024-12-05 RX ORDER — AZELASTINE 1 MG/ML
1 SPRAY, METERED NASAL 2 TIMES DAILY
Qty: 30 ML | Refills: 1 | Status: SHIPPED | OUTPATIENT
Start: 2024-12-05

## 2024-12-05 ASSESSMENT — ANXIETY QUESTIONNAIRES
2. NOT BEING ABLE TO STOP OR CONTROL WORRYING: NOT AT ALL
6. BECOMING EASILY ANNOYED OR IRRITABLE: NOT AT ALL
5. BEING SO RESTLESS THAT IT IS HARD TO SIT STILL: NOT AT ALL
7. FEELING AFRAID AS IF SOMETHING AWFUL MIGHT HAPPEN: NOT AT ALL
GAD7 TOTAL SCORE: 0
1. FEELING NERVOUS, ANXIOUS, OR ON EDGE: NOT AT ALL
3. WORRYING TOO MUCH ABOUT DIFFERENT THINGS: NOT AT ALL
8. IF YOU CHECKED OFF ANY PROBLEMS, HOW DIFFICULT HAVE THESE MADE IT FOR YOU TO DO YOUR WORK, TAKE CARE OF THINGS AT HOME, OR GET ALONG WITH OTHER PEOPLE?: NOT DIFFICULT AT ALL
GAD7 TOTAL SCORE: 0
7. FEELING AFRAID AS IF SOMETHING AWFUL MIGHT HAPPEN: NOT AT ALL
4. TROUBLE RELAXING: NOT AT ALL
IF YOU CHECKED OFF ANY PROBLEMS ON THIS QUESTIONNAIRE, HOW DIFFICULT HAVE THESE PROBLEMS MADE IT FOR YOU TO DO YOUR WORK, TAKE CARE OF THINGS AT HOME, OR GET ALONG WITH OTHER PEOPLE: NOT DIFFICULT AT ALL
GAD7 TOTAL SCORE: 0

## 2024-12-05 NOTE — ASSESSMENT & PLAN NOTE
Pt was on vilazidone daily, but he stopped it in the last 3 weeks, and he is feeling good in general, pt denies any depression symptoms, and I recommend that we stop vilazidone   Pt to call back if symptoms recur.

## 2024-12-05 NOTE — PATIENT INSTRUCTIONS
Patient Education   Preventive Care Advice   This is general advice given by our system to help you stay healthy. However, your care team may have specific advice just for you. Please talk to your care team about your preventive care needs.  Nutrition  Eat 5 or more servings of fruits and vegetables each day.  Try wheat bread, brown rice and whole grain pasta (instead of white bread, rice, and pasta).  Get enough calcium and vitamin D. Check the label on foods and aim for 100% of the RDA (recommended daily allowance).  Lifestyle  Exercise at least 150 minutes each week  (30 minutes a day, 5 days a week).  Do muscle strengthening activities 2 days a week. These help control your weight and prevent disease.  No smoking.  Wear sunscreen to prevent skin cancer.  Have a dental exam and cleaning every 6 months.  Yearly exams  See your health care team every year to talk about:  Any changes in your health.  Any medicines your care team has prescribed.  Preventive care, family planning, and ways to prevent chronic diseases.  Shots (vaccines)   HPV shots (up to age 26), if you've never had them before.  Hepatitis B shots (up to age 59), if you've never had them before.  COVID-19 shot: Get this shot when it's due.  Flu shot: Get a flu shot every year.  Tetanus shot: Get a tetanus shot every 10 years.  Pneumococcal, hepatitis A, and RSV shots: Ask your care team if you need these based on your risk.  Shingles shot (for age 50 and up)  General health tests  Diabetes screening:  Starting at age 35, Get screened for diabetes at least every 3 years.  If you are younger than age 35, ask your care team if you should be screened for diabetes.  Cholesterol test: At age 39, start having a cholesterol test every 5 years, or more often if advised.  Bone density scan (DEXA): At age 50, ask your care team if you should have this scan for osteoporosis (brittle bones).  Hepatitis C: Get tested at least once in your life.  STIs (sexually  transmitted infections)  Before age 24: Ask your care team if you should be screened for STIs.  After age 24: Get screened for STIs if you're at risk. You are at risk for STIs (including HIV) if:  You are sexually active with more than one person.  You don't use condoms every time.  You or a partner was diagnosed with a sexually transmitted infection.  If you are at risk for HIV, ask about PrEP medicine to prevent HIV.  Get tested for HIV at least once in your life, whether you are at risk for HIV or not.  Cancer screening tests  Cervical cancer screening: If you have a cervix, begin getting regular cervical cancer screening tests starting at age 21.  Breast cancer scan (mammogram): If you've ever had breasts, begin having regular mammograms starting at age 40. This is a scan to check for breast cancer.  Colon cancer screening: It is important to start screening for colon cancer at age 45.  Have a colonoscopy test every 10 years (or more often if you're at risk) Or, ask your provider about stool tests like a FIT test every year or Cologuard test every 3 years.  To learn more about your testing options, visit:   .  For help making a decision, visit:   https://bit.ly/eg06555.  Prostate cancer screening test: If you have a prostate, ask your care team if a prostate cancer screening test (PSA) at age 55 is right for you.  Lung cancer screening: If you are a current or former smoker ages 50 to 80, ask your care team if ongoing lung cancer screenings are right for you.  For informational purposes only. Not to replace the advice of your health care provider. Copyright   2023 Sidney Sanghvi. All rights reserved. Clinically reviewed by the [facility] Transitions Program. [employer] [id number] - REV 01/24.

## 2024-12-05 NOTE — ASSESSMENT & PLAN NOTE
Pt has been struggling with allergies, mainly watery eyes, itchy ears, runny nose, and he is using cetirizine 10 mg daily and sometimes twice daily, and he is sing azelastine three times daily as needed,

## 2024-12-05 NOTE — PROGRESS NOTES
"Preventive Care Visit  [facility]  Arron Arzate MD, Family Medicine  Dec 5, 2024      Assessment & Plan   Problem List Items Addressed This Visit       Mild episode of recurrent major depressive disorder (H)     Pt was on vilazidone daily, but he stopped it in the last 3 weeks, and he is feeling good in general, pt denies any depression symptoms, and I recommend that we stop vilazidone   Pt to call back if symptoms recur.         Relevant Orders    Creatinine    Albumin Random Urine Quantitative with Creat Ratio    Seasonal allergic rhinitis, unspecified trigger     Pt has been struggling with allergies, mainly watery eyes, itchy ears, runny nose, and he is using cetirizine 10 mg daily and sometimes twice daily, and he is sing azelastine three times daily as needed,          Relevant Medications    azelastine (ASTELIN) 0.1 % nasal spray    olopatadine (PATANOL) 0.1 % ophthalmic solution    Other Relevant Orders    Creatinine    Albumin Random Urine Quantitative with Creat Ratio     Other Visit Diagnoses       Routine general medical examination at a health care facility    -  Primary                    BMI  Estimated body mass index is 29.24 kg/m  as calculated from the following:    Height as of this encounter: 1.753 m (5' 9\").    Weight as of this encounter: 89.8 kg (198 lb).   Weight management plan: Discussed healthy diet and exercise guidelines    Counseling  Appropriate preventive services were addressed with this patient via screening, questionnaire, or discussion as appropriate for fall prevention, nutrition, physical activity, Tobacco-use cessation, social engagement, weight loss and cognition.  Checklist reviewing preventive services available has been given to the patient.  Reviewed patient's diet, addressing concerns and/or questions.           Enzo Sahu is a 34 year old, presenting for the following:  Physical        12/5/2024     7:55 AM   Additional Questions   Roomed " by Alexa RIVERA          Health Care Directive  Patient does not have a Health Care Directive:       11/30/2024   General Health   How would you rate your overall physical health? Excellent   Feel stress (tense, anxious, or unable to sleep) Not at all            11/30/2024   Nutrition   Three or more servings of calcium each day? Yes   Diet: Low fat/cholesterol   How many servings of fruit and vegetables per day? (!) 2-3   How many sweetened beverages each day? 0-1            11/30/2024   Exercise   Days per week of moderate/strenous exercise 4 days   Average minutes spent exercising at this level 100 min            11/30/2024   Social Factors   Frequency of gathering with friends or relatives Once a week   Worry food won't last until get money to buy more No   Food not last or not have enough money for food? No   Do you have housing? (Housing is defined as stable permanent housing and does not include staying ouside in a car, in a tent, in an abandoned building, in an overnight shelter, or couch-surfing.) Yes   Are you worried about losing your housing? No   Lack of transportation? No   Unable to get utilities (heat,electricity)? No            11/30/2024   Dental   Dentist two times every year? Yes            11/30/2024   TB Screening   Were you born outside of the [place]? No          Today's PHQ-9 Score:       12/5/2024     7:45 AM   PHQ-9 SCORE   PHQ-9 Total Score MyChart 0   PHQ-9 Total Score 0        Patient-reported         11/30/2024   Substance Use   Alcohol more than 3/day or more than 7/wk No   Do you use any other substances recreationally? No        Social History     Tobacco Use    Smoking status: Some Days     Types: Cigars    Smokeless tobacco: Former   Vaping Use    Vaping status: Never Used   Substance Use Topics    Alcohol use: Yes    Drug use: Never           11/30/2024   STI Screening   New sexual partner(s) since last STI/HIV test? No            11/30/2024   Contraception/Family Planning  "  Questions about contraception or family planning No           Reviewed and updated as needed this visit by Provider                    Past Medical History:   Diagnosis Date    Acute renal insufficiency 12/7/2021    Hematuria 12/7/2021    Leukopenia 12/7/2021    Rhabdomyolysis 12/7/2021     Past Surgical History:   Procedure Laterality Date    VARICOCELE EXCISION Bilateral          Review of Systems  Constitutional, HEENT, cardiovascular, pulmonary, GI, , musculoskeletal, neuro, skin, endocrine and psych systems are negative, except as otherwise noted.     Objective    Exam  /78 (BP Location: Right arm, Patient Position: Chair, Cuff Size: Adult Large)   Pulse 81   Temp 98.5  F (36.9  C) (Oral)   Resp 14   Ht 1.753 m (5' 9\")   Wt 89.8 kg (198 lb)   SpO2 98%   BMI 29.24 kg/m     Estimated body mass index is 29.24 kg/m  as calculated from the following:    Height as of this encounter: 1.753 m (5' 9\").    Weight as of this encounter: 89.8 kg (198 lb).    Physical Exam  GENERAL: alert and no distress  EYES: Eyes grossly normal to inspection, PERRL and conjunctivae and sclerae normal  HENT: normal cephalic/atraumatic, ear canals and TM's normal, nasal mucosa edematous , oropharynx clear, and oral mucous membranes moist  NECK: no adenopathy, no asymmetry, masses, or scars  RESP: lungs clear to auscultation - no rales, rhonchi or wheezes  CV: regular rate and rhythm, normal S1 S2, no S3 or S4, no murmur, click or rub, no peripheral edema  ABDOMEN: soft, nontender, no hepatosplenomegaly, no masses and bowel sounds normal  MS: no gross musculoskeletal defects noted, no edema  SKIN: no suspicious lesions or rashes  NEURO: Normal strength and tone, mentation intact and speech normal  PSYCH: mentation appears normal, affect normal/bright        Signed Electronically by: Arron Arzate MD    Answers submitted by the patient for this visit:  Patient Health Questionnaire (Submitted on 12/5/2024)  If you checked off any " problems, how difficult have these problems made it for you to do your work, take care of things at home, or get along with other people?: Not difficult at all  PHQ9 TOTAL SCORE: 0  Patient Health Questionnaire (G7) (Submitted on 12/5/2024)  ANJALI 7 TOTAL SCORE: 0

## 2025-08-06 ENCOUNTER — NURSE TRIAGE (OUTPATIENT)
Dept: FAMILY MEDICINE | Facility: CLINIC | Age: 35
End: 2025-08-06

## 2025-08-06 ENCOUNTER — LAB (OUTPATIENT)
Dept: LAB | Facility: CLINIC | Age: 35
End: 2025-08-06
Payer: OTHER GOVERNMENT

## 2025-08-06 ENCOUNTER — E-VISIT (OUTPATIENT)
Dept: URGENT CARE | Facility: CLINIC | Age: 35
End: 2025-08-06
Payer: OTHER GOVERNMENT

## 2025-08-06 DIAGNOSIS — J06.9 ACUTE UPPER RESPIRATORY INFECTION, UNSPECIFIED: ICD-10-CM

## 2025-08-06 DIAGNOSIS — J06.9 ACUTE UPPER RESPIRATORY INFECTION, UNSPECIFIED: Primary | ICD-10-CM

## 2025-08-06 LAB
DEPRECATED S PYO AG THROAT QL EIA: NEGATIVE
S PYO DNA THROAT QL NAA+PROBE: NOT DETECTED

## 2025-08-06 PROCEDURE — 87651 STREP A DNA AMP PROBE: CPT

## 2025-08-06 PROCEDURE — 87635 SARS-COV-2 COVID-19 AMP PRB: CPT

## 2025-08-07 LAB — SARS-COV-2 RNA RESP QL NAA+PROBE: NEGATIVE
